# Patient Record
Sex: MALE | Race: WHITE
[De-identification: names, ages, dates, MRNs, and addresses within clinical notes are randomized per-mention and may not be internally consistent; named-entity substitution may affect disease eponyms.]

---

## 2014-03-05 LAB
ALBUMIN SERPL-MCNC: NORMAL G/DL
ALP BLD-CCNC: NORMAL U/L
ALT SERPL-CCNC: NORMAL U/L
ANION GAP SERPL CALCULATED.3IONS-SCNC: NORMAL MMOL/L
AST SERPL-CCNC: NORMAL U/L
AVERAGE GLUCOSE: NORMAL
BILIRUB SERPL-MCNC: NORMAL MG/DL
BUN BLDV-MCNC: NORMAL MG/DL
CALCIUM SERPL-MCNC: NORMAL MG/DL
CHLORIDE BLD-SCNC: NORMAL MMOL/L
CHOLESTEROL, TOTAL: NORMAL
CHOLESTEROL/HDL RATIO: NORMAL
CO2: NORMAL
CREAT SERPL-MCNC: NORMAL MG/DL
CREATININE, URINE: NORMAL
GFR CALCULATED: NORMAL
GLUCOSE BLD-MCNC: NORMAL MG/DL
HBA1C MFR BLD: 9.8 %
HDLC SERPL-MCNC: NORMAL MG/DL
LDL CHOLESTEROL CALCULATED: NORMAL
MICROALBUMIN/CREAT 24H UR: 0.7 MG/G{CREAT}
MICROALBUMIN/CREAT UR-RTO: NORMAL
POTASSIUM SERPL-SCNC: NORMAL MMOL/L
SODIUM BLD-SCNC: NORMAL MMOL/L
TOTAL PROTEIN: NORMAL
TRIGL SERPL-MCNC: NORMAL MG/DL
VLDLC SERPL CALC-MCNC: NORMAL MG/DL

## 2017-04-19 ENCOUNTER — HOSPITAL ENCOUNTER (OUTPATIENT)
Dept: HOSPITAL 83 - US | Age: 82
Discharge: HOME | End: 2017-04-19
Attending: INTERNAL MEDICINE
Payer: MEDICARE

## 2017-04-19 DIAGNOSIS — E11.22: ICD-10-CM

## 2017-04-19 DIAGNOSIS — I12.9: Primary | ICD-10-CM

## 2017-04-19 DIAGNOSIS — N25.81: ICD-10-CM

## 2017-04-19 DIAGNOSIS — D63.1: ICD-10-CM

## 2017-04-19 DIAGNOSIS — Z79.899: ICD-10-CM

## 2017-04-19 DIAGNOSIS — E55.9: ICD-10-CM

## 2017-04-19 DIAGNOSIS — N18.3: ICD-10-CM

## 2017-04-19 LAB
25(OH)D3 SERPL-MCNC: 36.3 NG/ML (ref 30–100)
ALBUMIN SERPL-MCNC: 3.6 GM/DL (ref 3.1–4.5)
ALBUMIN SERPL-MCNC: NEGATIVE G/DL
APPEARANCE UR: (no result)
BASOPHILS # BLD AUTO: 0.1 10*3/UL (ref 0–0.1)
BASOPHILS NFR BLD AUTO: 1 % (ref 0–1)
BILIRUB UR QL STRIP: NEGATIVE
BUN SERPL-MCNC: 29 MG/DL (ref 7–24)
CHLORIDE SERPL-SCNC: 103 MMOL/L (ref 98–107)
CO2 SERPL-SCNC: 27 MMOL/L (ref 21–32)
COLOR UR: (no result)
EOSINOPHIL # BLD AUTO: 0.2 10*3/UL (ref 0–0.4)
EOSINOPHIL # BLD AUTO: 2.9 % (ref 1–4)
EPI CELLS #/AREA URNS HPF: (no result) /[HPF]
ERYTHROCYTE [DISTWIDTH] IN BLOOD BY AUTOMATED COUNT: 14.9 % (ref 0–14.5)
EST. AVERAGE GLUCOSE BLD GHB EST-MCNC: 217 MG/DL
FERRITIN SERPL-MCNC: 32.7 NG/ML (ref 22–322)
GLUCOSE SERPL-MCNC: 172 MG/DL (ref 65–99)
GLUCOSE UR QL: NEGATIVE
HCT VFR BLD AUTO: 39.4 % (ref 42–52)
HGB BLD-MCNC: 12.5 G/DL (ref 14–18)
HGB UR QL STRIP: NEGATIVE
IG #: 0 10*3/UL (ref 0–0.1)
IRON SATN MFR SERPL: 9 %
IRON SERPL-MCNC: 39 UG/DL (ref 65–175)
KETONES UR QL STRIP: NEGATIVE
LEUKOCYTE ESTERASE UR QL STRIP: NEGATIVE
LYMPHOCYTES # BLD AUTO: 1.6 10*3/UL (ref 1.3–4.4)
LYMPHOCYTES NFR BLD AUTO: 20.2 % (ref 27–41)
MAGNESIUM SERPL-MCNC: 2.2 MG/DL (ref 1.5–2.1)
MCH RBC QN AUTO: 27.4 PG (ref 27–31)
MCHC RBC AUTO-ENTMCNC: 31.7 G/DL (ref 33–37)
MCV RBC AUTO: 86.4 FL (ref 80–94)
MONOCYTES # BLD AUTO: 0.6 10*3/UL (ref 0.1–1)
MONOCYTES NFR BLD MANUAL: 7.1 % (ref 3–9)
NEUT #: 5.5 10*3/UL (ref 2.3–7.9)
NEUT %: 68.4 % (ref 47–73)
NITRITE UR QL STRIP: NEGATIVE
NRBC BLD QL AUTO: 0 % (ref 0–0)
PH UR STRIP: 6 [PH] (ref 5–9)
PHOSPHATE SERPL-MCNC: 2.3 MG/DL (ref 2.5–4.9)
PLATELET # BLD AUTO: 389 10*3/UL (ref 130–400)
PMV BLD AUTO: 10 FL (ref 9.6–12.3)
POTASSIUM SERPL-SCNC: 4.5 MMOL/L (ref 3.5–5.1)
PTH-INTACT SERPL-MCNC: 15.9 PG/ML (ref 14–72)
RBC # BLD AUTO: 4.56 10*6/UL (ref 4.5–5.9)
RBC #/AREA URNS HPF: (no result) RBC/HPF (ref 0–2)
SODIUM SERPL-SCNC: 138 MMOL/L (ref 136–145)
SP GR UR: 1.01 (ref 1–1.03)
UIBC SERPL-MCNC: 367 UG/DL (ref 110–410)
URINE REFLEX COMMENT: NO
UROBILINOGEN UR STRIP-MCNC: 0.2 E.U./DL (ref 0.2–1)
WBC NRBC COR # BLD AUTO: 8 10*3/UL (ref 4.8–10.8)

## 2018-01-17 ENCOUNTER — HOSPITAL ENCOUNTER (OUTPATIENT)
Dept: HOSPITAL 83 - D | Age: 83
Discharge: HOME | End: 2018-01-17
Attending: INTERNAL MEDICINE
Payer: MEDICARE

## 2018-01-17 DIAGNOSIS — E11.65: Primary | ICD-10-CM

## 2018-02-26 ENCOUNTER — HOSPITAL ENCOUNTER (OUTPATIENT)
Dept: HOSPITAL 83 - LAB | Age: 83
Discharge: HOME | End: 2018-02-26
Attending: INTERNAL MEDICINE
Payer: MEDICARE

## 2018-02-26 DIAGNOSIS — E11.65: Primary | ICD-10-CM

## 2018-02-26 DIAGNOSIS — E55.9: ICD-10-CM

## 2018-02-26 LAB
APPEARANCE UR: (no result)
BILIRUB UR QL STRIP: NEGATIVE
BUN SERPL-MCNC: 44 MG/DL (ref 7–24)
CHLORIDE SERPL-SCNC: 103 MMOL/L (ref 98–107)
COLOR UR: YELLOW
CREAT SERPL-MCNC: 2.01 MG/DL (ref 0.7–1.3)
GLUCOSE UR QL: NEGATIVE
HGB UR QL STRIP: NEGATIVE
KETONES UR QL STRIP: NEGATIVE
LEUKOCYTE ESTERASE UR QL STRIP: NEGATIVE
NITRITE UR QL STRIP: NEGATIVE
PH UR STRIP: 5.5 [PH] (ref 5–9)
POTASSIUM SERPL-SCNC: 3.9 MMOL/L (ref 3.5–5.1)
SODIUM SERPL-SCNC: 138 MMOL/L (ref 136–145)
SP GR UR: 1.01 (ref 1–1.03)
UROBILINOGEN UR STRIP-MCNC: 0.2 E.U./DL (ref 0.2–1)

## 2018-06-12 ENCOUNTER — HOSPITAL ENCOUNTER (OUTPATIENT)
Dept: HOSPITAL 83 - LAB | Age: 83
Discharge: HOME | End: 2018-06-12
Attending: INTERNAL MEDICINE
Payer: MEDICARE

## 2018-06-12 DIAGNOSIS — E55.9: Primary | ICD-10-CM

## 2018-06-12 DIAGNOSIS — E78.5: ICD-10-CM

## 2018-06-12 DIAGNOSIS — E11.65: ICD-10-CM

## 2018-06-12 LAB
ALBUMIN SERPL-MCNC: 3.6 GM/DL (ref 3.1–4.5)
ALP SERPL-CCNC: 67 U/L (ref 45–117)
ALT SERPL W P-5'-P-CCNC: 28 U/L (ref 12–78)
APPEARANCE UR: (no result)
AST SERPL-CCNC: 15 IU/L (ref 3–35)
BACTERIA #/AREA URNS HPF: (no result) /[HPF]
BILIRUB UR QL STRIP: NEGATIVE
BUN SERPL-MCNC: 30 MG/DL (ref 7–24)
CHLORIDE SERPL-SCNC: 106 MMOL/L (ref 98–107)
CHOLEST SERPL-MCNC: 142 MG/DL (ref ?–200)
COLOR UR: YELLOW
CREAT SERPL-MCNC: 1.67 MG/DL (ref 0.7–1.3)
EPI CELLS #/AREA URNS HPF: (no result) /[HPF]
GLUCOSE UR QL: NEGATIVE
HDLC SERPL-MCNC: 51 MG/DL (ref 40–60)
HGB UR QL STRIP: NEGATIVE
KETONES UR QL STRIP: NEGATIVE
LDLC SERPL DIRECT ASSAY-MCNC: 74 MG/DL (ref 9–159)
LEUKOCYTE ESTERASE UR QL STRIP: NEGATIVE
NITRITE UR QL STRIP: NEGATIVE
PH UR STRIP: 6.5 [PH] (ref 5–9)
POTASSIUM SERPL-SCNC: 3.9 MMOL/L (ref 3.5–5.1)
PROT SERPL-MCNC: 7.8 GM/DL (ref 6.4–8.2)
SODIUM SERPL-SCNC: 140 MMOL/L (ref 136–145)
SP GR UR: 1.02 (ref 1–1.03)
TRIGL SERPL-MCNC: 84 MG/DL (ref ?–150)
UROBILINOGEN UR STRIP-MCNC: 0.2 E.U./DL (ref 0.2–1)
VLDLC SERPL CALC-MCNC: 17 MG/DL (ref 6–40)
WBC #/AREA URNS HPF: (no result) WBC/HPF (ref 0–5)

## 2018-07-09 ENCOUNTER — HOSPITAL ENCOUNTER (OUTPATIENT)
Dept: HOSPITAL 83 - WOUNDCARE | Age: 83
End: 2018-07-09
Attending: NURSE PRACTITIONER
Payer: MEDICARE

## 2018-07-09 DIAGNOSIS — Z87.891: ICD-10-CM

## 2018-07-09 DIAGNOSIS — E66.9: ICD-10-CM

## 2018-07-09 DIAGNOSIS — L97.821: ICD-10-CM

## 2018-07-09 DIAGNOSIS — I10: ICD-10-CM

## 2018-07-09 DIAGNOSIS — E11.622: Primary | ICD-10-CM

## 2018-07-16 ENCOUNTER — HOSPITAL ENCOUNTER (OUTPATIENT)
Dept: HOSPITAL 83 - WOUNDCARE | Age: 83
Discharge: HOME | End: 2018-07-16
Attending: NURSE PRACTITIONER
Payer: MEDICARE

## 2018-07-16 DIAGNOSIS — E66.9: ICD-10-CM

## 2018-07-16 DIAGNOSIS — E11.622: Primary | ICD-10-CM

## 2018-07-16 DIAGNOSIS — N18.9: ICD-10-CM

## 2018-07-16 DIAGNOSIS — I12.9: ICD-10-CM

## 2018-07-16 DIAGNOSIS — L97.821: ICD-10-CM

## 2018-07-16 DIAGNOSIS — E11.22: ICD-10-CM

## 2018-07-16 DIAGNOSIS — Z87.891: ICD-10-CM

## 2018-07-24 ENCOUNTER — HOSPITAL ENCOUNTER (OUTPATIENT)
Dept: HOSPITAL 83 - US | Age: 83
Discharge: HOME | End: 2018-07-24
Attending: NURSE PRACTITIONER
Payer: MEDICARE

## 2018-07-24 DIAGNOSIS — E66.9: ICD-10-CM

## 2018-07-24 DIAGNOSIS — I87.319: ICD-10-CM

## 2018-07-24 DIAGNOSIS — L03.116: ICD-10-CM

## 2018-07-24 DIAGNOSIS — E11.622: Primary | ICD-10-CM

## 2018-07-24 DIAGNOSIS — R09.89: ICD-10-CM

## 2018-07-24 DIAGNOSIS — E11.22: ICD-10-CM

## 2018-07-24 DIAGNOSIS — L97.821: ICD-10-CM

## 2018-07-24 DIAGNOSIS — Z87.891: ICD-10-CM

## 2018-07-24 DIAGNOSIS — N18.9: ICD-10-CM

## 2018-07-24 DIAGNOSIS — I12.9: ICD-10-CM

## 2018-07-24 DIAGNOSIS — I10: ICD-10-CM

## 2018-07-30 ENCOUNTER — HOSPITAL ENCOUNTER (OUTPATIENT)
Dept: HOSPITAL 83 - WOUNDCARE | Age: 83
Discharge: HOME | End: 2018-07-30
Attending: NURSE PRACTITIONER
Payer: MEDICARE

## 2018-07-30 DIAGNOSIS — E11.622: Primary | ICD-10-CM

## 2018-07-30 DIAGNOSIS — I12.9: ICD-10-CM

## 2018-07-30 DIAGNOSIS — Z87.891: ICD-10-CM

## 2018-07-30 DIAGNOSIS — N18.9: ICD-10-CM

## 2018-07-30 DIAGNOSIS — E66.9: ICD-10-CM

## 2018-07-30 DIAGNOSIS — L97.821: ICD-10-CM

## 2018-07-30 DIAGNOSIS — E11.22: ICD-10-CM

## 2018-11-05 ENCOUNTER — HOSPITAL ENCOUNTER (OUTPATIENT)
Dept: HOSPITAL 83 - LAB | Age: 83
Discharge: HOME | End: 2018-11-05
Attending: INTERNAL MEDICINE
Payer: MEDICARE

## 2018-11-05 DIAGNOSIS — E11.65: Primary | ICD-10-CM

## 2018-11-05 DIAGNOSIS — E78.5: ICD-10-CM

## 2018-11-05 DIAGNOSIS — E55.9: ICD-10-CM

## 2018-11-05 LAB
ALBUMIN SERPL-MCNC: 3.4 GM/DL (ref 3.1–4.5)
ALP SERPL-CCNC: 59 U/L (ref 45–117)
ALT SERPL W P-5'-P-CCNC: 24 U/L (ref 12–78)
APPEARANCE UR: (no result)
AST SERPL-CCNC: 19 IU/L (ref 3–35)
BACTERIA #/AREA URNS HPF: (no result) /[HPF]
BILIRUB UR QL STRIP: NEGATIVE
BUN SERPL-MCNC: 32 MG/DL (ref 7–24)
CHLORIDE SERPL-SCNC: 107 MMOL/L (ref 98–107)
CHOLEST SERPL-MCNC: 142 MG/DL (ref ?–200)
COLOR UR: YELLOW
CREAT SERPL-MCNC: 1.57 MG/DL (ref 0.7–1.3)
EPI CELLS #/AREA URNS HPF: (no result) /[HPF]
GLUCOSE UR QL: NEGATIVE
HDLC SERPL-MCNC: 46 MG/DL (ref 40–60)
HGB UR QL STRIP: NEGATIVE
KETONES UR QL STRIP: NEGATIVE
LDLC SERPL DIRECT ASSAY-MCNC: 80 MG/DL (ref 9–159)
LEUKOCYTE ESTERASE UR QL STRIP: NEGATIVE
NITRITE UR QL STRIP: NEGATIVE
PH UR STRIP: 6 [PH] (ref 5–9)
POTASSIUM SERPL-SCNC: 4.2 MMOL/L (ref 3.5–5.1)
PROT SERPL-MCNC: 7.8 GM/DL (ref 6.4–8.2)
RBC #/AREA URNS HPF: (no result) RBC/HPF (ref 0–2)
SODIUM SERPL-SCNC: 140 MMOL/L (ref 136–145)
SP GR UR: 1.02 (ref 1–1.03)
TRIGL SERPL-MCNC: 79 MG/DL (ref ?–150)
UROBILINOGEN UR STRIP-MCNC: 0.2 E.U./DL (ref 0.2–1)
VLDLC SERPL CALC-MCNC: 16 MG/DL (ref 6–40)
WBC #/AREA URNS HPF: (no result) WBC/HPF (ref 0–5)

## 2019-06-05 ENCOUNTER — HOSPITAL ENCOUNTER (OUTPATIENT)
Dept: HOSPITAL 83 - LAB | Age: 84
Discharge: HOME | End: 2019-06-05
Attending: INTERNAL MEDICINE
Payer: MEDICARE

## 2019-06-05 DIAGNOSIS — E11.21: Primary | ICD-10-CM

## 2019-06-05 DIAGNOSIS — N18.3: ICD-10-CM

## 2019-06-05 DIAGNOSIS — D63.1: ICD-10-CM

## 2019-06-05 DIAGNOSIS — Z79.899: ICD-10-CM

## 2019-06-05 DIAGNOSIS — E55.9: ICD-10-CM

## 2019-06-05 DIAGNOSIS — N25.81: ICD-10-CM

## 2019-06-05 DIAGNOSIS — E11.22: ICD-10-CM

## 2019-06-05 LAB
25(OH)D3 SERPL-MCNC: 20 NG/ML (ref 30–100)
ALBUMIN SERPL-MCNC: 3.5 GM/DL (ref 3.1–4.5)
APPEARANCE UR: CLEAR
BACTERIA #/AREA URNS HPF: (no result) /[HPF]
BASOPHILS # BLD AUTO: 0.1 10*3/UL (ref 0–0.1)
BASOPHILS NFR BLD AUTO: 1 % (ref 0–1)
BILIRUB UR QL STRIP: NEGATIVE
BUN SERPL-MCNC: 35 MG/DL (ref 7–24)
CASTS URNS QL MICRO: (no result)
CHLORIDE SERPL-SCNC: 101 MMOL/L (ref 98–107)
COLOR UR: YELLOW
CREAT SERPL-MCNC: 2.43 MG/DL (ref 0.7–1.3)
EOSINOPHIL # BLD AUTO: 0.3 10*3/UL (ref 0–0.4)
EOSINOPHIL # BLD AUTO: 3.3 % (ref 1–4)
EPI CELLS #/AREA URNS HPF: (no result) /[HPF]
ERYTHROCYTE [DISTWIDTH] IN BLOOD BY AUTOMATED COUNT: 14 % (ref 0–14.5)
FERRITIN SERPL-MCNC: 52.3 NG/ML (ref 22–322)
GLUCOSE UR QL: NEGATIVE
HCT VFR BLD AUTO: 41.4 % (ref 42–52)
HGB BLD-MCNC: 13 G/DL (ref 14–18)
HGB UR QL STRIP: NEGATIVE
IRON SERPL-MCNC: 50 UG/DL (ref 65–175)
KETONES UR QL STRIP: NEGATIVE
LEUKOCYTE ESTERASE UR QL STRIP: NEGATIVE
LYMPHOCYTES # BLD AUTO: 1.7 10*3/UL (ref 1.3–4.4)
LYMPHOCYTES NFR BLD AUTO: 19.4 % (ref 27–41)
MCH RBC QN AUTO: 28.3 PG (ref 27–31)
MCHC RBC AUTO-ENTMCNC: 31.4 G/DL (ref 33–37)
MCV RBC AUTO: 90.2 FL (ref 80–94)
MONOCYTES # BLD AUTO: 0.7 10*3/UL (ref 0.1–1)
MONOCYTES NFR BLD MANUAL: 7.9 % (ref 3–9)
NEUT #: 5.9 10*3/UL (ref 2.3–7.9)
NEUT %: 68.2 % (ref 47–73)
NITRITE UR QL STRIP: NEGATIVE
NRBC BLD QL AUTO: 0 10*3/UL (ref 0–0)
PH UR STRIP: 6 [PH] (ref 5–9)
PHOSPHATE SERPL-MCNC: 2.5 MG/DL (ref 2.5–4.9)
PLATELET # BLD AUTO: 380 10*3/UL (ref 130–400)
PMV BLD AUTO: 10 FL (ref 9.6–12.3)
POTASSIUM SERPL-SCNC: 4.8 MMOL/L (ref 3.5–5.1)
PTH-INTACT SERPL-MCNC: 49.4 PG/ML (ref 18.5–88)
RBC # BLD AUTO: 4.59 10*6/UL (ref 4.5–5.9)
SODIUM SERPL-SCNC: 135 MMOL/L (ref 136–145)
SP GR UR: <= 1.005 (ref 1–1.03)
TIBC SERPL-MCNC: 340 UG/DL (ref 250–450)
UROBILINOGEN UR STRIP-MCNC: 0.2 E.U./DL (ref 0.2–1)
WBC #/AREA URNS HPF: (no result) WBC/HPF (ref 0–5)
WBC NRBC COR # BLD AUTO: 8.7 10*3/UL (ref 4.8–10.8)

## 2019-10-25 ENCOUNTER — HOSPITAL ENCOUNTER (EMERGENCY)
Dept: HOSPITAL 83 - ED | Age: 84
Discharge: HOME | End: 2019-10-25
Payer: MEDICARE

## 2019-10-25 VITALS — BODY MASS INDEX: 38.5 KG/M2 | HEIGHT: 73.98 IN | WEIGHT: 300 LBS

## 2019-10-25 DIAGNOSIS — Z86.718: ICD-10-CM

## 2019-10-25 DIAGNOSIS — Z86.73: ICD-10-CM

## 2019-10-25 DIAGNOSIS — E78.5: ICD-10-CM

## 2019-10-25 DIAGNOSIS — Z79.899: ICD-10-CM

## 2019-10-25 DIAGNOSIS — Z90.49: ICD-10-CM

## 2019-10-25 DIAGNOSIS — E86.0: Primary | ICD-10-CM

## 2019-10-25 DIAGNOSIS — Z79.4: ICD-10-CM

## 2019-10-25 DIAGNOSIS — I10: ICD-10-CM

## 2019-10-25 DIAGNOSIS — E11.9: ICD-10-CM

## 2019-10-25 LAB
ALBUMIN SERPL-MCNC: 3.5 GM/DL (ref 3.1–4.5)
ALP SERPL-CCNC: 56 U/L (ref 45–117)
ALT SERPL W P-5'-P-CCNC: 26 U/L (ref 12–78)
APPEARANCE UR: CLEAR
APTT PPP: 28.2 SECONDS (ref 20–32.1)
AST SERPL-CCNC: 13 IU/L (ref 3–35)
BACTERIA #/AREA URNS HPF: (no result) /[HPF]
BASOPHILS # BLD AUTO: 0.1 10*3/UL (ref 0–0.1)
BASOPHILS NFR BLD AUTO: 1.2 % (ref 0–1)
BILIRUB UR QL STRIP: NEGATIVE
BUN SERPL-MCNC: 35 MG/DL (ref 7–24)
CHLORIDE SERPL-SCNC: 107 MMOL/L (ref 98–107)
COLOR UR: YELLOW
CREAT SERPL-MCNC: 2.18 MG/DL (ref 0.7–1.3)
EOSINOPHIL # BLD AUTO: 0.2 10*3/UL (ref 0–0.4)
EOSINOPHIL # BLD AUTO: 2.4 % (ref 1–4)
EPI CELLS #/AREA URNS HPF: (no result) /[HPF]
ERYTHROCYTE [DISTWIDTH] IN BLOOD BY AUTOMATED COUNT: 14 % (ref 0–14.5)
GLUCOSE UR QL: NEGATIVE
HCT VFR BLD AUTO: 40.8 % (ref 42–52)
HGB BLD-MCNC: 12.7 G/DL (ref 14–18)
HGB UR QL STRIP: NEGATIVE
INR BLD: 1 (ref 2–3.5)
KETONES UR QL STRIP: NEGATIVE
LEUKOCYTE ESTERASE UR QL STRIP: NEGATIVE
LIPASE SERPL-CCNC: 121 U/L (ref 73–393)
LYMPHOCYTES # BLD AUTO: 1.4 10*3/UL (ref 1.3–4.4)
LYMPHOCYTES NFR BLD AUTO: 14.9 % (ref 27–41)
MCH RBC QN AUTO: 28.7 PG (ref 27–31)
MCHC RBC AUTO-ENTMCNC: 31.1 G/DL (ref 33–37)
MCV RBC AUTO: 92.3 FL (ref 80–94)
MONOCYTES # BLD AUTO: 0.7 10*3/UL (ref 0.1–1)
MONOCYTES NFR BLD MANUAL: 6.9 % (ref 3–9)
NEUT #: 7 10*3/UL (ref 2.3–7.9)
NEUT %: 74.2 % (ref 47–73)
NITRITE UR QL STRIP: NEGATIVE
NRBC BLD QL AUTO: 0 10*3/UL (ref 0–0)
PH UR STRIP: 7 [PH] (ref 5–9)
PLATELET # BLD AUTO: 397 10*3/UL (ref 130–400)
PMV BLD AUTO: 9.7 FL (ref 9.6–12.3)
POTASSIUM SERPL-SCNC: 4.2 MMOL/L (ref 3.5–5.1)
PROT SERPL-MCNC: 8.1 GM/DL (ref 6.4–8.2)
RBC # BLD AUTO: 4.42 10*6/UL (ref 4.5–5.9)
RBC #/AREA URNS HPF: (no result) RBC/HPF (ref 0–2)
SODIUM SERPL-SCNC: 139 MMOL/L (ref 136–145)
SP GR UR: 1.01 (ref 1–1.03)
TROPONIN I SERPL-MCNC: < 0.015 NG/ML (ref ?–0.04)
UROBILINOGEN UR STRIP-MCNC: 0.2 E.U./DL (ref 0.2–1)
WBC NRBC COR # BLD AUTO: 9.4 10*3/UL (ref 4.8–10.8)

## 2020-01-11 ENCOUNTER — HOSPITAL ENCOUNTER (OUTPATIENT)
Dept: HOSPITAL 83 - LAB | Age: 85
Discharge: HOME | End: 2020-01-11
Attending: INTERNAL MEDICINE
Payer: MEDICARE

## 2020-01-11 DIAGNOSIS — E11.65: Primary | ICD-10-CM

## 2020-01-11 DIAGNOSIS — E78.5: ICD-10-CM

## 2020-01-11 DIAGNOSIS — E55.9: ICD-10-CM

## 2020-01-11 LAB
ALBUMIN SERPL-MCNC: 3.3 GM/DL (ref 3.1–4.5)
ALP SERPL-CCNC: 60 U/L (ref 45–117)
ALT SERPL W P-5'-P-CCNC: 19 U/L (ref 12–78)
APPEARANCE UR: (no result)
AST SERPL-CCNC: 11 IU/L (ref 3–35)
BACTERIA #/AREA URNS HPF: (no result) /[HPF]
BILIRUB UR QL STRIP: NEGATIVE
BUN SERPL-MCNC: 30 MG/DL (ref 7–24)
CHLORIDE SERPL-SCNC: 107 MMOL/L (ref 98–107)
CHOLEST SERPL-MCNC: 149 MG/DL (ref ?–200)
COLOR UR: YELLOW
CREAT SERPL-MCNC: 1.8 MG/DL (ref 0.7–1.3)
EPI CELLS #/AREA URNS HPF: (no result) /[HPF]
GLUCOSE UR QL: NEGATIVE
HDLC SERPL-MCNC: 42 MG/DL (ref 40–60)
HGB UR QL STRIP: NEGATIVE
KETONES UR QL STRIP: NEGATIVE
LDLC SERPL DIRECT ASSAY-MCNC: 85 MG/DL (ref 9–159)
LEUKOCYTE ESTERASE UR QL STRIP: NEGATIVE
MUCOUS THREADS URNS QL MICRO: (no result)
NITRITE UR QL STRIP: NEGATIVE
PH UR STRIP: 6 [PH] (ref 5–9)
POTASSIUM SERPL-SCNC: 4.5 MMOL/L (ref 3.5–5.1)
PROT SERPL-MCNC: 7.6 GM/DL (ref 6.4–8.2)
RBC #/AREA URNS HPF: (no result) RBC/HPF (ref 0–2)
SODIUM SERPL-SCNC: 138 MMOL/L (ref 136–145)
SP GR UR: 1.02 (ref 1–1.03)
TRIGL SERPL-MCNC: 109 MG/DL (ref ?–150)
UROBILINOGEN UR STRIP-MCNC: 0.2 E.U./DL (ref 0.2–1)
VLDLC SERPL CALC-MCNC: 22 MG/DL (ref 6–40)
WBC #/AREA URNS HPF: (no result) WBC/HPF (ref 0–5)

## 2020-01-16 RX ORDER — CLONIDINE HYDROCHLORIDE 0.1 MG/1
0.1 TABLET ORAL 3 TIMES DAILY
COMMUNITY

## 2020-01-16 RX ORDER — PRAVASTATIN SODIUM 40 MG
40 TABLET ORAL DAILY
COMMUNITY
End: 2022-05-20

## 2020-01-16 RX ORDER — FENOFIBRATE 145 MG/1
145 TABLET, COATED ORAL DAILY
COMMUNITY

## 2020-01-16 RX ORDER — GLIPIZIDE 10 MG/1
10 TABLET, FILM COATED, EXTENDED RELEASE ORAL DAILY
COMMUNITY
End: 2022-05-20

## 2020-01-16 SDOH — HEALTH STABILITY: MENTAL HEALTH: HOW OFTEN DO YOU HAVE A DRINK CONTAINING ALCOHOL?: NEVER

## 2020-09-10 ENCOUNTER — HOSPITAL ENCOUNTER (OUTPATIENT)
Dept: HOSPITAL 83 - LAB | Age: 85
Discharge: HOME | End: 2020-09-10
Attending: INTERNAL MEDICINE
Payer: MEDICARE

## 2020-09-10 DIAGNOSIS — E78.5: ICD-10-CM

## 2020-09-10 DIAGNOSIS — R53.83: ICD-10-CM

## 2020-09-10 DIAGNOSIS — E11.65: Primary | ICD-10-CM

## 2020-09-10 DIAGNOSIS — E55.9: ICD-10-CM

## 2020-09-10 DIAGNOSIS — N40.0: ICD-10-CM

## 2020-09-10 LAB
25(OH)D3 SERPL-MCNC: 39.5 NG/ML (ref 30–100)
ALBUMIN SERPL-MCNC: 3.5 GM/DL (ref 3.1–4.5)
ALP SERPL-CCNC: 59 U/L (ref 45–117)
ALT SERPL W P-5'-P-CCNC: 25 U/L (ref 12–78)
APPEARANCE UR: (no result)
AST SERPL-CCNC: 15 IU/L (ref 3–35)
BACTERIA #/AREA URNS HPF: (no result) /[HPF]
BILIRUB UR QL STRIP: NEGATIVE
BUN SERPL-MCNC: 22 MG/DL (ref 7–24)
CHLORIDE SERPL-SCNC: 108 MMOL/L (ref 98–107)
CHOLEST SERPL-MCNC: 170 MG/DL (ref ?–200)
COLOR UR: YELLOW
CREAT SERPL-MCNC: 1.59 MG/DL (ref 0.7–1.3)
EPI CELLS #/AREA URNS HPF: (no result) /[HPF]
GLUCOSE UR QL: NEGATIVE
HDLC SERPL-MCNC: 45 MG/DL (ref 40–60)
HGB UR QL STRIP: NEGATIVE
IRON SERPL-MCNC: 44 UG/DL (ref 65–175)
KETONES UR QL STRIP: NEGATIVE
LDLC SERPL DIRECT ASSAY-MCNC: 106 MG/DL (ref 9–159)
LEUKOCYTE ESTERASE UR QL STRIP: (no result)
MUCOUS THREADS URNS QL MICRO: (no result)
NITRITE UR QL STRIP: NEGATIVE
PH UR STRIP: 6.5 [PH] (ref 4.5–8)
POTASSIUM SERPL-SCNC: 4.2 MMOL/L (ref 3.5–5.1)
PROT SERPL-MCNC: 7.6 GM/DL (ref 6.4–8.2)
RBC #/AREA URNS HPF: (no result) RBC/HPF (ref 0–2)
SODIUM SERPL-SCNC: 142 MMOL/L (ref 136–145)
SP GR UR: 1.01 (ref 1–1.03)
T4 FREE SERPL-MCNC: 1.12 NG/DL (ref 0.76–1.46)
TESTOST SERPL-MCNC: 182 NG/DL (ref 241–827)
TIBC SERPL-MCNC: 347 UG/DL (ref 250–450)
TRIGL SERPL-MCNC: 96 MG/DL (ref ?–150)
TSH SERPL DL<=0.005 MIU/L-ACNC: 4.35 UIU/ML (ref 0.36–4.75)
UROBILINOGEN UR STRIP-MCNC: 1 E.U./DL (ref 0–1)
VITAMIN B12: 340 PG/ML (ref 247–911)
VLDLC SERPL CALC-MCNC: 19 MG/DL (ref 6–40)
WBC #/AREA URNS HPF: (no result) WBC/HPF (ref 0–5)

## 2020-12-02 ENCOUNTER — HOSPITAL ENCOUNTER (OUTPATIENT)
Dept: HOSPITAL 83 - LAB | Age: 85
Discharge: HOME | End: 2020-12-02
Attending: INTERNAL MEDICINE
Payer: MEDICARE

## 2020-12-02 DIAGNOSIS — E11.65: Primary | ICD-10-CM

## 2020-12-02 DIAGNOSIS — E55.9: ICD-10-CM

## 2020-12-02 DIAGNOSIS — N40.0: ICD-10-CM

## 2020-12-02 DIAGNOSIS — E78.5: ICD-10-CM

## 2020-12-02 DIAGNOSIS — R53.83: ICD-10-CM

## 2020-12-02 LAB
25(OH)D3 SERPL-MCNC: 67.3 NG/ML (ref 30–100)
ALBUMIN SERPL-MCNC: 3.3 GM/DL (ref 3.1–4.5)
ALP SERPL-CCNC: 74 U/L (ref 45–117)
ALT SERPL W P-5'-P-CCNC: 19 U/L (ref 12–78)
AST SERPL-CCNC: 16 IU/L (ref 3–35)
BACTERIA #/AREA URNS HPF: (no result) /[HPF]
BUN SERPL-MCNC: 25 MG/DL (ref 7–24)
CHLORIDE SERPL-SCNC: 105 MMOL/L (ref 98–107)
CHOLEST SERPL-MCNC: 156 MG/DL (ref ?–200)
CREAT SERPL-MCNC: 1.55 MG/DL (ref 0.7–1.3)
EPI CELLS #/AREA URNS HPF: (no result) /[HPF]
HDLC SERPL-MCNC: 52 MG/DL (ref 40–60)
IRON SERPL-MCNC: 57 UG/DL (ref 65–175)
LDLC SERPL DIRECT ASSAY-MCNC: 83 MG/DL (ref 9–159)
LEUKOCYTE ESTERASE UR QL STRIP: (no result)
MUCOUS THREADS URNS QL MICRO: (no result)
PH UR STRIP: 7 [PH] (ref 4.5–8)
POTASSIUM SERPL-SCNC: 4.7 MMOL/L (ref 3.5–5.1)
PROT SERPL-MCNC: 8.1 GM/DL (ref 6.4–8.2)
RBC #/AREA URNS HPF: (no result) RBC/HPF (ref 0–2)
SODIUM SERPL-SCNC: 138 MMOL/L (ref 136–145)
SP GR UR: 1.01 (ref 1–1.03)
T4 FREE SERPL-MCNC: 1.16 NG/DL (ref 0.76–1.46)
TESTOST SERPL-MCNC: 194 NG/DL (ref 241–827)
TIBC SERPL-MCNC: 349 UG/DL (ref 250–450)
TRIGL SERPL-MCNC: 103 MG/DL (ref ?–150)
TSH SERPL DL<=0.005 MIU/L-ACNC: 5.9 UIU/ML (ref 0.36–4.75)
UROBILINOGEN UR STRIP-MCNC: 1 E.U./DL (ref 0–1)
VITAMIN B12: 381 PG/ML (ref 247–911)
VLDLC SERPL CALC-MCNC: 21 MG/DL (ref 6–40)
WBC #/AREA URNS HPF: (no result) WBC/HPF (ref 0–5)

## 2021-02-22 ENCOUNTER — HOSPITAL ENCOUNTER (OUTPATIENT)
Dept: HOSPITAL 83 - LAB | Age: 86
Discharge: HOME | End: 2021-02-22
Attending: INTERNAL MEDICINE
Payer: MEDICARE

## 2021-02-22 DIAGNOSIS — D63.1: ICD-10-CM

## 2021-02-22 DIAGNOSIS — E55.9: ICD-10-CM

## 2021-02-22 DIAGNOSIS — E11.22: Primary | ICD-10-CM

## 2021-02-22 DIAGNOSIS — Z79.899: ICD-10-CM

## 2021-02-22 DIAGNOSIS — N18.31: ICD-10-CM

## 2021-02-22 DIAGNOSIS — N25.81: ICD-10-CM

## 2021-02-22 DIAGNOSIS — E11.21: ICD-10-CM

## 2021-02-22 LAB
25(OH)D3 SERPL-MCNC: 72.7 NG/ML (ref 30–100)
BACTERIA #/AREA URNS HPF: (no result) /[HPF]
BASOPHILS # BLD AUTO: 0.1 10*3/UL (ref 0–0.1)
BASOPHILS NFR BLD AUTO: 0.9 % (ref 0–1)
EOSINOPHIL # BLD AUTO: 0.4 10*3/UL (ref 0–0.4)
EOSINOPHIL # BLD AUTO: 2.7 % (ref 1–4)
EPI CELLS #/AREA URNS HPF: (no result) /[HPF]
ERYTHROCYTE [DISTWIDTH] IN BLOOD BY AUTOMATED COUNT: 14 % (ref 0–14.5)
FERRITIN SERPL-MCNC: 47.4 NG/ML (ref 22–322)
HCT VFR BLD AUTO: 44.9 % (ref 42–52)
IRON SERPL-MCNC: 51 UG/DL (ref 65–175)
LEUKOCYTE ESTERASE UR QL STRIP: (no result)
LYMPHOCYTES # BLD AUTO: 4 10*3/UL (ref 1.3–4.4)
LYMPHOCYTES NFR BLD AUTO: 30.8 % (ref 27–41)
MCH RBC QN AUTO: 27.8 PG (ref 27–31)
MCHC RBC AUTO-ENTMCNC: 30.7 G/DL (ref 33–37)
MCV RBC AUTO: 90.5 FL (ref 80–94)
MONOCYTES # BLD AUTO: 1.1 10*3/UL (ref 0.1–1)
MONOCYTES NFR BLD MANUAL: 8.4 % (ref 3–9)
NEUT #: 7.4 10*3/UL (ref 2.3–7.9)
NEUT %: 56.9 % (ref 47–73)
NRBC BLD QL AUTO: 0 % (ref 0–0)
PH UR STRIP: 6 [PH] (ref 4.5–8)
PLATELET # BLD AUTO: 458 10*3/UL (ref 130–400)
PMV BLD AUTO: 9.5 FL (ref 9.6–12.3)
PTH-INTACT SERPL-MCNC: 18.6 PG/ML (ref 18.5–88)
RBC # BLD AUTO: 4.96 10*6/UL (ref 4.5–5.9)
RBC #/AREA URNS HPF: (no result) RBC/HPF (ref 0–2)
SP GR UR: 1.01 (ref 1–1.03)
TIBC SERPL-MCNC: 374 UG/DL (ref 250–450)
UROBILINOGEN UR STRIP-MCNC: 0.2 E.U./DL (ref 0–1)
WBC #/AREA URNS HPF: (no result) WBC/HPF (ref 0–5)
WBC NRBC COR # BLD AUTO: 13.1 10*3/UL (ref 4.8–10.8)

## 2021-04-07 ENCOUNTER — HOSPITAL ENCOUNTER (OUTPATIENT)
Dept: HOSPITAL 83 - LAB | Age: 86
Discharge: HOME | End: 2021-04-07
Attending: INTERNAL MEDICINE
Payer: MEDICARE

## 2021-04-07 DIAGNOSIS — E11.65: Primary | ICD-10-CM

## 2021-04-07 DIAGNOSIS — E78.5: ICD-10-CM

## 2021-04-07 DIAGNOSIS — E55.9: ICD-10-CM

## 2021-04-07 DIAGNOSIS — D50.8: ICD-10-CM

## 2021-04-07 LAB
ALBUMIN SERPL-MCNC: 3.4 GM/DL (ref 3.1–4.5)
ALP SERPL-CCNC: 71 U/L (ref 45–117)
ALT SERPL W P-5'-P-CCNC: 24 U/L (ref 12–78)
AST SERPL-CCNC: 10 IU/L (ref 3–35)
BUN SERPL-MCNC: 27 MG/DL (ref 7–24)
CHLORIDE SERPL-SCNC: 105 MMOL/L (ref 98–107)
CHOLEST SERPL-MCNC: 175 MG/DL (ref ?–200)
CREAT SERPL-MCNC: 1.59 MG/DL (ref 0.7–1.3)
EPI CELLS #/AREA URNS HPF: (no result) /[HPF]
HDLC SERPL-MCNC: 55 MG/DL (ref 40–60)
IRON SERPL-MCNC: 52 UG/DL (ref 65–175)
LDLC SERPL DIRECT ASSAY-MCNC: 102 MG/DL (ref 9–159)
LEUKOCYTE ESTERASE UR QL STRIP: (no result)
PH UR STRIP: 7.5 [PH] (ref 4.5–8)
POTASSIUM SERPL-SCNC: 4.4 MMOL/L (ref 3.5–5.1)
PROT SERPL-MCNC: 8.1 GM/DL (ref 6.4–8.2)
RBC #/AREA URNS HPF: (no result) RBC/HPF (ref 0–2)
SODIUM SERPL-SCNC: 136 MMOL/L (ref 136–145)
SP GR UR: 1.01 (ref 1–1.03)
TIBC SERPL-MCNC: 403 UG/DL (ref 250–450)
TRIGL SERPL-MCNC: 90 MG/DL (ref ?–150)
UROBILINOGEN UR STRIP-MCNC: 1 E.U./DL (ref 0–1)
VLDLC SERPL CALC-MCNC: 18 MG/DL (ref 6–40)
WBC #/AREA URNS HPF: (no result) WBC/HPF (ref 0–5)

## 2021-09-08 ENCOUNTER — HOSPITAL ENCOUNTER (OUTPATIENT)
Dept: HOSPITAL 83 - LAB | Age: 86
Discharge: HOME | End: 2021-09-08
Attending: INTERNAL MEDICINE
Payer: MEDICARE

## 2021-09-08 DIAGNOSIS — E78.5: Primary | ICD-10-CM

## 2021-09-08 DIAGNOSIS — D50.8: ICD-10-CM

## 2021-09-08 DIAGNOSIS — E55.9: ICD-10-CM

## 2021-09-08 DIAGNOSIS — E11.65: ICD-10-CM

## 2021-09-08 LAB
ALBUMIN SERPL-MCNC: 3.3 GM/DL (ref 3.1–4.5)
ALP SERPL-CCNC: 58 U/L (ref 45–117)
ALT SERPL W P-5'-P-CCNC: 22 U/L (ref 12–78)
AST SERPL-CCNC: 14 IU/L (ref 3–35)
BACTERIA #/AREA URNS HPF: (no result) /[HPF]
BUN SERPL-MCNC: 30 MG/DL (ref 7–24)
CHLORIDE SERPL-SCNC: 107 MMOL/L (ref 98–107)
CHOLEST SERPL-MCNC: 162 MG/DL (ref ?–200)
CREAT SERPL-MCNC: 1.8 MG/DL (ref 0.7–1.3)
EPI CELLS #/AREA URNS HPF: (no result) /[HPF]
IRON SERPL-MCNC: 54 UG/DL (ref 65–175)
LDLC SERPL DIRECT ASSAY-MCNC: 89 MG/DL (ref 9–159)
PH UR STRIP: 6.5 [PH] (ref 4.5–8)
POTASSIUM SERPL-SCNC: 3.9 MMOL/L (ref 3.5–5.1)
PROT SERPL-MCNC: 7.9 GM/DL (ref 6.4–8.2)
RBC #/AREA URNS HPF: (no result) RBC/HPF (ref 0–2)
SODIUM SERPL-SCNC: 140 MMOL/L (ref 136–145)
SP GR UR: 1.01 (ref 1–1.03)
TIBC SERPL-MCNC: 408 UG/DL (ref 250–450)
TRIGL SERPL-MCNC: 93 MG/DL (ref ?–150)
UROBILINOGEN UR STRIP-MCNC: 1 E.U./DL (ref 0–1)
WBC #/AREA URNS HPF: (no result) WBC/HPF (ref 0–5)

## 2021-10-14 ENCOUNTER — HOSPITAL ENCOUNTER (OUTPATIENT)
Dept: HOSPITAL 83 - RAD | Age: 86
Discharge: HOME | End: 2021-10-14
Attending: NURSE PRACTITIONER
Payer: MEDICARE

## 2021-10-14 DIAGNOSIS — U07.1: Primary | ICD-10-CM

## 2021-10-14 DIAGNOSIS — R06.02: ICD-10-CM

## 2021-10-14 DIAGNOSIS — J98.11: ICD-10-CM

## 2021-10-14 DIAGNOSIS — J84.89: ICD-10-CM

## 2021-11-19 ENCOUNTER — HOSPITAL ENCOUNTER (OUTPATIENT)
Dept: HOSPITAL 83 - RAD | Age: 86
Discharge: HOME | End: 2021-11-19
Attending: NURSE PRACTITIONER
Payer: MEDICARE

## 2021-11-19 DIAGNOSIS — J12.82: ICD-10-CM

## 2021-11-19 DIAGNOSIS — U07.1: Primary | ICD-10-CM

## 2021-12-17 ENCOUNTER — HOSPITAL ENCOUNTER (OUTPATIENT)
Dept: HOSPITAL 83 - CT | Age: 86
Discharge: HOME | End: 2021-12-17
Attending: NURSE PRACTITIONER
Payer: MEDICARE

## 2021-12-17 DIAGNOSIS — J43.2: Primary | ICD-10-CM

## 2021-12-17 DIAGNOSIS — K80.20: ICD-10-CM

## 2022-02-02 ENCOUNTER — HOSPITAL ENCOUNTER (OUTPATIENT)
Dept: HOSPITAL 83 - LAB | Age: 87
Discharge: HOME | End: 2022-02-02
Attending: INTERNAL MEDICINE
Payer: MEDICARE

## 2022-02-02 DIAGNOSIS — E11.65: Primary | ICD-10-CM

## 2022-02-02 DIAGNOSIS — E55.9: ICD-10-CM

## 2022-02-02 DIAGNOSIS — N40.0: ICD-10-CM

## 2022-02-02 DIAGNOSIS — E29.1: ICD-10-CM

## 2022-02-02 LAB
25(OH)D3 SERPL-MCNC: 36.4 NG/ML (ref 30–100)
ALBUMIN SERPL-MCNC: 3.2 GM/DL (ref 3.1–4.5)
ALP SERPL-CCNC: 64 U/L (ref 45–117)
ALT SERPL W P-5'-P-CCNC: 22 U/L (ref 12–78)
AST SERPL-CCNC: 11 IU/L (ref 3–35)
BUN SERPL-MCNC: 42 MG/DL (ref 7–24)
CHLORIDE SERPL-SCNC: 100 MMOL/L (ref 98–107)
CHOLEST SERPL-MCNC: 165 MG/DL (ref ?–200)
CREAT SERPL-MCNC: 2.11 MG/DL (ref 0.7–1.3)
EPI CELLS #/AREA URNS HPF: (no result) /[HPF]
GLUCOSE UR QL: (no result)
IRON SERPL-MCNC: 55 UG/DL (ref 65–175)
LDLC SERPL DIRECT ASSAY-MCNC: 89 MG/DL (ref 9–159)
MUCOUS THREADS URNS QL MICRO: (no result)
PH UR STRIP: 6 [PH] (ref 4.5–8)
POTASSIUM SERPL-SCNC: 4.3 MMOL/L (ref 3.5–5.1)
PROT SERPL-MCNC: 7.9 GM/DL (ref 6.4–8.2)
SODIUM SERPL-SCNC: 134 MMOL/L (ref 136–145)
SP GR UR: 1.02 (ref 1–1.03)
TESTOST SERPL-MCNC: 271 NG/DL (ref 241–827)
TIBC SERPL-MCNC: 384 UG/DL (ref 250–450)
TRIGL SERPL-MCNC: 132 MG/DL (ref ?–150)
UROBILINOGEN UR STRIP-MCNC: 0.2 E.U./DL (ref 0–1)
WBC #/AREA URNS HPF: (no result) WBC/HPF (ref 0–5)

## 2022-05-20 RX ORDER — HYDRALAZINE HYDROCHLORIDE 10 MG/1
10 TABLET, FILM COATED ORAL 3 TIMES DAILY
COMMUNITY

## 2022-05-20 RX ORDER — INSULIN GLARGINE 100 [IU]/ML
64 INJECTION, SOLUTION SUBCUTANEOUS NIGHTLY
COMMUNITY

## 2022-05-20 RX ORDER — LOSARTAN POTASSIUM 100 MG/1
100 TABLET ORAL DAILY
COMMUNITY

## 2022-05-20 RX ORDER — FUROSEMIDE 40 MG/1
40 TABLET ORAL DAILY
COMMUNITY

## 2022-05-20 RX ORDER — NIFEDIPINE 90 MG/1
90 TABLET, FILM COATED, EXTENDED RELEASE ORAL DAILY
COMMUNITY

## 2022-05-20 NOTE — PROGRESS NOTES
[facility] PRE-ADMISSION TESTING INSTRUCTIONS    The Preadmission Testing patient is instructed accordingly using the following criteria (check applicable):    ARRIVAL INSTRUCTIONS:  [x] Parking the day of Surgery is located in the Main Entrance lot. Upon entering the door, make an immediate right to the surgery reception desk    [x] Bring photo ID and insurance card    [x] Bring in a copy of Living will or Durable Power of  papers. [] Please be sure to arrange for responsible adult to provide transportation to and from the hospital    [x] Please arrange for responsible adult to be with you for the 24 hour period post procedure due to having anesthesia    [x] If you awake am of surgery not feeling well or have temperature >100 please call [phone]    GENERAL INSTRUCTIONS:    [x] Nothing by mouth after midnight, including gum, candy, mints or water    [x] You may brush your teeth, but do not swallow any water    [x] Take medications as instructed with 1-2 oz of water    [x] Stop herbal supplements and vitamins 5 days prior to procedure    [x] Follow preop dosing of blood thinners per physician instructions    [x] Take 1/2 dose of evening insulin, but no insulin after midnight    [x] No oral diabetic medications after midnight    [x] If diabetic and have low blood sugar or feel symptomatic, take 1-2oz apple juice only    [] Bring inhalers day of surgery    [] Bring C-PAP/ Bi-Pap day of surgery    [] Bring urine specimen day of surgery    [x] Shower or bath with soap, lather and rinse well, AM of Surgery, no lotion, powders or creams to surgical site    [] Follow bowel prep as instructed per surgeon    [x] No tobacco products within 24 hours of surgery     [x] No alcohol or illegal drug use within 24 hours of surgery.     [x] Jewelry, body piercing's, eyeglasses, contact lenses and dentures are not permitted into surgery (bring cases)      [] Please do not wear any nail polish, make up or hair products on the day of surgery    [x] You can expect a call the business day prior to procedure to notify you if your arrival time changes    [x] If you receive a survey after surgery we would greatly appreciate your comments    [] Parent/guardian of a minor must accompany their child and remain on the premises  the entire time they are under our care     [] Pediatric patients may bring favorite toy, blanket or comfort item with them    [x] A caregiver or family member must remain with the patient during their stay if they are mentally handicapped, have dementia, disoriented or unable to use a call light or would be a safety concern if left unattended    [x] Please notify surgeon if you develop any illness between now and time of surgery (cold, cough, sore throat, fever, nausea, vomiting) or any signs of infections  including skin, wounds, and dental.    []  The Outpatient Pharmacy is available to fill your prescription here on your day of surgery, ask your preop nurse for details    [x] Other instructions: wear comfortable clothing    EDUCATIONAL MATERIALS PROVIDED:    [] PAT Preoperative Education Packet/Booklet     [] Medication List    [] Transfusion bracelet applied with instructions    [] Shower with soap, lather and rinse well, and use CHG wipes provided the evening before surgery as instructed    [] Incentive spirometer with instructions

## 2022-05-23 ENCOUNTER — PREP FOR PROCEDURE (OUTPATIENT)
Dept: SURGERY | Age: 87
End: 2022-05-23

## 2022-05-23 RX ORDER — SODIUM CHLORIDE 0.9 % (FLUSH) 0.9 %
5-40 SYRINGE (ML) INJECTION PRN
Status: CANCELLED | OUTPATIENT
Start: 2022-05-23

## 2022-05-23 RX ORDER — SODIUM CHLORIDE, SODIUM LACTATE, POTASSIUM CHLORIDE, CALCIUM CHLORIDE 600; 310; 30; 20 MG/100ML; MG/100ML; MG/100ML; MG/100ML
INJECTION, SOLUTION INTRAVENOUS CONTINUOUS
Status: CANCELLED | OUTPATIENT
Start: 2022-05-23

## 2022-05-23 RX ORDER — SODIUM CHLORIDE 0.9 % (FLUSH) 0.9 %
5-40 SYRINGE (ML) INJECTION EVERY 12 HOURS SCHEDULED
Status: CANCELLED | OUTPATIENT
Start: 2022-05-23

## 2022-05-23 NOTE — H&P (VIEW-ONLY)
rhythm, no murmurs[ ]  LUNGS: Clear to auscultation bilaterally, no wheezes[ ]  ABDOMEN: soft, non-tender, non-distended, obese  EXTREMETIES: warm and dry. No rash, cyanosis, or jaundice. Changes consistent with venous stasis disease. Mild bilateral edema to the knees. 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 Requires wide local excision. Due to the location he will most likely require skin grafting; would plan either pinch graft or STSG, probably from the abdominal wall or thigh           Time spent reviewing records, discussing findings, answering questions, reviewing laboratory studies, radiologic exams, and other diagnostics, and reviewing the diagnostic and therapeutic plan: 30 minutes    Voice recognition software was used in the preparation of this document. Despite all efforts to prevent them, transcription errors may have occurred.   Seen by:

## 2022-05-23 NOTE — H&P
Date:   [facility]  Name: Rani Osborn                : 1935 Sex: M  Age: 82 yrs  Acct#:  [id number]          Patient was referred by . Patient's primary care provider is . CHIEF COMPLAINT: Nonhealing skin lesion left leg    HPI:  61-year-old male who has a lesion on his left leg for several months. He takes xarelto for Afib. Meds Prior to Visit:  Losartan Potassium     Clonidine HCL     Xarelto     Furosemide     Nifedipine ER     Fenofibrate     Humalog     Lantus Solostar        Allergies:      PMH:  (Health Maintenance)  (Medical Problems)  (Surgeries)  Reviewed and updated. SURGERIES:[ ]  FH:  Father:  . (Hx)  Mother:  . (Hx)  Reviewed and updated. [ ]  SH:  Personal Habits:  Tobacco Use: Patient has never smoked. Alcohol: Denies alcohol use. Drug Use: Denies Drug Use. Daily Caffeine: Uses Caffeine. Reviewed and updated. [ ]    Date: 2022  Was the patient queried about smoking behavior? Yes  No  Does the patient currently smoke? Tobacco Use: Patient has never smoked. [ ]  ROS:  Const: Denies anorexia, anxiety, fatigue, night sweats, weight gain and weight loss. Eyes: Denies eye symptoms. ENMT: Denies ear symptoms. Denies nasal symptoms. Denies mouth or throat symptoms. CV: Denies hypertension and other cardiovascular symptoms. Resp: Denies respiratory symptoms. GI: Denies hepatitis, liver disease and other gastrointestinal symptoms. Musculo: Denies musculoskeletal symptoms. Skin: Denies skin, hair and nail symptoms. Breast: Denies breast problems. Neuro: Denies neurologic symptoms. Psych: Denies depression and substance abuse. Endocrine: Denies diabetes, kidney disease and thyroid disease. Hema/Lymph: Denies anemia, blood disease, cancer and past transfusion. Allergy/Immuno: Denies immunosuppression. Reviewed and updated. [ ]    Ht: 74\" 6'2\" Wt: 312lb BMI: 40.1      CONSTITUTION:  Non-toxic, no acute distress[ ]  HEART: Regular rate and rhythm, no murmurs[ ]  LUNGS: Clear to auscultation bilaterally, no wheezes[ ]  ABDOMEN: soft, non-tender, non-distended, obese  EXTREMETIES: warm and dry. No rash, cyanosis, or jaundice. Changes consistent with venous stasis disease. Mild bilateral edema to the knees. On the left leg there is a 2 x 2 centimeter raised pigmented lesion     IMAGING: [ ]    LABS: [ ]        Assessment #1: Hx F98.6 Neoplasm of uncertain behavior of skin   Care Plan:              Comments       :  Lesion is suspicious for neoplasm. Requires wide local excision. Due to the location he will most likely require skin grafting; would plan either pinch graft or STSG, probably from the abdominal wall or thigh           Time spent reviewing records, discussing findings, answering questions, reviewing laboratory studies, radiologic exams, and other diagnostics, and reviewing the diagnostic and therapeutic plan: 30 minutes    Voice recognition software was used in the preparation of this document. Despite all efforts to prevent them, transcription errors may have occurred.   Seen by:

## 2022-05-24 ENCOUNTER — ANESTHESIA (OUTPATIENT)
Dept: OPERATING ROOM | Age: 87
End: 2022-05-24
Payer: MEDICARE

## 2022-05-24 ENCOUNTER — APPOINTMENT (OUTPATIENT)
Dept: GENERAL RADIOLOGY | Age: 87
End: 2022-05-24
Attending: SURGERY
Payer: MEDICARE

## 2022-05-24 ENCOUNTER — ANESTHESIA EVENT (OUTPATIENT)
Dept: OPERATING ROOM | Age: 87
End: 2022-05-24
Payer: MEDICARE

## 2022-05-24 ENCOUNTER — HOSPITAL ENCOUNTER (OUTPATIENT)
Age: 87
Setting detail: OUTPATIENT SURGERY
Discharge: HOME | End: 2022-05-24
Attending: SURGERY | Admitting: SURGERY
Payer: MEDICARE

## 2022-05-24 VITALS
DIASTOLIC BLOOD PRESSURE: 84 MMHG | HEIGHT: 73 IN | SYSTOLIC BLOOD PRESSURE: 184 MMHG | BODY MASS INDEX: 41.75 KG/M2 | RESPIRATION RATE: 18 BRPM | OXYGEN SATURATION: 94 % | TEMPERATURE: 97 F | HEART RATE: 97 BPM | WEIGHT: 315 LBS

## 2022-05-24 DIAGNOSIS — D49.9 NEOPLASM: ICD-10-CM

## 2022-05-24 LAB
ANION GAP SERPL CALCULATED.3IONS-SCNC: 10 MMOL/L (ref 7–16)
BUN BLDV-MCNC: 26 MG/DL (ref 6–23)
CALCIUM SERPL-MCNC: 9.4 MG/DL (ref 8.6–10.2)
CHLORIDE BLD-SCNC: 99 MMOL/L (ref 98–107)
CO2: 26 MMOL/L (ref 22–29)
CREAT SERPL-MCNC: 1.5 MG/DL (ref 0.7–1.2)
EKG ATRIAL RATE: 86 BPM
EKG P AXIS: 25 DEGREES
EKG P-R INTERVAL: 176 MS
EKG Q-T INTERVAL: 420 MS
EKG QRS DURATION: 146 MS
EKG QTC CALCULATION (BAZETT): 502 MS
EKG R AXIS: 10 DEGREES
EKG T AXIS: 14 DEGREES
EKG VENTRICULAR RATE: 86 BPM
GFR AFRICAN AMERICAN: 54
GFR NON-AFRICAN AMERICAN: 45 ML/MIN/1.73
GLUCOSE BLD-MCNC: 213 MG/DL (ref 74–99)
HCT VFR BLD CALC: 42 % (ref 37–54)
HEMOGLOBIN: 12.9 G/DL (ref 12.5–16.5)
MCH RBC QN AUTO: 26.5 PG (ref 26–35)
MCHC RBC AUTO-ENTMCNC: 30.7 % (ref 32–34.5)
MCV RBC AUTO: 86.2 FL (ref 80–99.9)
METER GLUCOSE: 193 MG/DL (ref 74–99)
PDW BLD-RTO: 14.9 FL (ref 11.5–15)
PLATELET # BLD: 384 E9/L (ref 130–450)
PMV BLD AUTO: 9.8 FL (ref 7–12)
POTASSIUM SERPL-SCNC: 4.2 MMOL/L (ref 3.5–5)
RBC # BLD: 4.87 E12/L (ref 3.8–5.8)
SODIUM BLD-SCNC: 135 MMOL/L (ref 132–146)
WBC # BLD: 8.1 E9/L (ref 4.5–11.5)

## 2022-05-24 PROCEDURE — 36415 COLL VENOUS BLD VENIPUNCTURE: CPT

## 2022-05-24 PROCEDURE — 7100000010 HC PHASE II RECOVERY - FIRST 15 MIN: Performed by: SURGERY

## 2022-05-24 PROCEDURE — 85027 COMPLETE CBC AUTOMATED: CPT

## 2022-05-24 PROCEDURE — 80048 BASIC METABOLIC PNL TOTAL CA: CPT

## 2022-05-24 PROCEDURE — 2580000003 HC RX 258: Performed by: NURSE ANESTHETIST, CERTIFIED REGISTERED

## 2022-05-24 PROCEDURE — 6360000002 HC RX W HCPCS: Performed by: NURSE ANESTHETIST, CERTIFIED REGISTERED

## 2022-05-24 PROCEDURE — 3600000002 HC SURGERY LEVEL 2 BASE: Performed by: SURGERY

## 2022-05-24 PROCEDURE — 2709999900 HC NON-CHARGEABLE SUPPLY: Performed by: SURGERY

## 2022-05-24 PROCEDURE — 2500000003 HC RX 250 WO HCPCS: Performed by: NURSE ANESTHETIST, CERTIFIED REGISTERED

## 2022-05-24 PROCEDURE — 7100000001 HC PACU RECOVERY - ADDTL 15 MIN: Performed by: SURGERY

## 2022-05-24 PROCEDURE — 82962 GLUCOSE BLOOD TEST: CPT

## 2022-05-24 PROCEDURE — 88304 TISSUE EXAM BY PATHOLOGIST: CPT

## 2022-05-24 PROCEDURE — 71045 X-RAY EXAM CHEST 1 VIEW: CPT

## 2022-05-24 PROCEDURE — 3600000012 HC SURGERY LEVEL 2 ADDTL 15MIN: Performed by: SURGERY

## 2022-05-24 PROCEDURE — 7100000011 HC PHASE II RECOVERY - ADDTL 15 MIN: Performed by: SURGERY

## 2022-05-24 PROCEDURE — 88305 TISSUE EXAM BY PATHOLOGIST: CPT

## 2022-05-24 PROCEDURE — 93005 ELECTROCARDIOGRAM TRACING: CPT

## 2022-05-24 PROCEDURE — 3700000000 HC ANESTHESIA ATTENDED CARE: Performed by: SURGERY

## 2022-05-24 PROCEDURE — 6370000000 HC RX 637 (ALT 250 FOR IP): Performed by: SURGERY

## 2022-05-24 PROCEDURE — 2500000003 HC RX 250 WO HCPCS: Performed by: SURGERY

## 2022-05-24 PROCEDURE — 7100000000 HC PACU RECOVERY - FIRST 15 MIN: Performed by: SURGERY

## 2022-05-24 PROCEDURE — 3700000001 HC ADD 15 MINUTES (ANESTHESIA): Performed by: SURGERY

## 2022-05-24 RX ORDER — MINERAL OIL
OIL (ML) MISCELLANEOUS PRN
Status: DISCONTINUED | OUTPATIENT
Start: 2022-05-24 | End: 2022-05-24 | Stop reason: ALTCHOICE

## 2022-05-24 RX ORDER — FENTANYL CITRATE 50 UG/ML
INJECTION, SOLUTION INTRAMUSCULAR; INTRAVENOUS PRN
Status: DISCONTINUED | OUTPATIENT
Start: 2022-05-24 | End: 2022-05-24 | Stop reason: SDUPTHER

## 2022-05-24 RX ORDER — SODIUM CHLORIDE 0.9 % (FLUSH) 0.9 %
5-40 SYRINGE (ML) INJECTION EVERY 12 HOURS SCHEDULED
Status: DISCONTINUED | OUTPATIENT
Start: 2022-05-24 | End: 2022-05-24 | Stop reason: HOSPADM

## 2022-05-24 RX ORDER — SODIUM CHLORIDE 9 MG/ML
INJECTION, SOLUTION INTRAVENOUS CONTINUOUS PRN
Status: DISCONTINUED | OUTPATIENT
Start: 2022-05-24 | End: 2022-05-24 | Stop reason: SDUPTHER

## 2022-05-24 RX ORDER — OXYCODONE HYDROCHLORIDE 5 MG/1
10 TABLET ORAL PRN
Status: DISCONTINUED | OUTPATIENT
Start: 2022-05-24 | End: 2022-05-24 | Stop reason: HOSPADM

## 2022-05-24 RX ORDER — EPHEDRINE SULFATE/0.9% NACL/PF 50 MG/5 ML
SYRINGE (ML) INTRAVENOUS PRN
Status: DISCONTINUED | OUTPATIENT
Start: 2022-05-24 | End: 2022-05-24 | Stop reason: SDUPTHER

## 2022-05-24 RX ORDER — SODIUM CHLORIDE, SODIUM LACTATE, POTASSIUM CHLORIDE, CALCIUM CHLORIDE 600; 310; 30; 20 MG/100ML; MG/100ML; MG/100ML; MG/100ML
INJECTION, SOLUTION INTRAVENOUS CONTINUOUS
Status: DISCONTINUED | OUTPATIENT
Start: 2022-05-24 | End: 2022-05-24 | Stop reason: HOSPADM

## 2022-05-24 RX ORDER — PROPOFOL 10 MG/ML
INJECTION, EMULSION INTRAVENOUS PRN
Status: DISCONTINUED | OUTPATIENT
Start: 2022-05-24 | End: 2022-05-24 | Stop reason: SDUPTHER

## 2022-05-24 RX ORDER — CEFAZOLIN SODIUM 1 G/3ML
INJECTION, POWDER, FOR SOLUTION INTRAMUSCULAR; INTRAVENOUS PRN
Status: DISCONTINUED | OUTPATIENT
Start: 2022-05-24 | End: 2022-05-24 | Stop reason: SDUPTHER

## 2022-05-24 RX ORDER — SODIUM CHLORIDE 9 MG/ML
INJECTION, SOLUTION INTRAVENOUS PRN
Status: DISCONTINUED | OUTPATIENT
Start: 2022-05-24 | End: 2022-05-24 | Stop reason: HOSPADM

## 2022-05-24 RX ORDER — ROCURONIUM BROMIDE 10 MG/ML
INJECTION, SOLUTION INTRAVENOUS PRN
Status: DISCONTINUED | OUTPATIENT
Start: 2022-05-24 | End: 2022-05-24 | Stop reason: SDUPTHER

## 2022-05-24 RX ORDER — GLYCOPYRROLATE 0.2 MG/ML
INJECTION INTRAMUSCULAR; INTRAVENOUS PRN
Status: DISCONTINUED | OUTPATIENT
Start: 2022-05-24 | End: 2022-05-24 | Stop reason: SDUPTHER

## 2022-05-24 RX ORDER — FENTANYL CITRATE 50 UG/ML
25 INJECTION, SOLUTION INTRAMUSCULAR; INTRAVENOUS EVERY 5 MIN PRN
Status: DISCONTINUED | OUTPATIENT
Start: 2022-05-24 | End: 2022-05-24 | Stop reason: HOSPADM

## 2022-05-24 RX ORDER — LIDOCAINE HYDROCHLORIDE 10 MG/ML
INJECTION, SOLUTION INFILTRATION; PERINEURAL PRN
Status: DISCONTINUED | OUTPATIENT
Start: 2022-05-24 | End: 2022-05-24 | Stop reason: ALTCHOICE

## 2022-05-24 RX ORDER — CEFAZOLIN SODIUM 1 G/3ML
INJECTION, POWDER, FOR SOLUTION INTRAMUSCULAR; INTRAVENOUS
Status: COMPLETED
Start: 2022-05-24 | End: 2022-05-24

## 2022-05-24 RX ORDER — ONDANSETRON 2 MG/ML
INJECTION INTRAMUSCULAR; INTRAVENOUS PRN
Status: DISCONTINUED | OUTPATIENT
Start: 2022-05-24 | End: 2022-05-24 | Stop reason: SDUPTHER

## 2022-05-24 RX ORDER — SODIUM CHLORIDE 0.9 % (FLUSH) 0.9 %
5-40 SYRINGE (ML) INJECTION PRN
Status: DISCONTINUED | OUTPATIENT
Start: 2022-05-24 | End: 2022-05-24 | Stop reason: HOSPADM

## 2022-05-24 RX ORDER — DEXAMETHASONE SODIUM PHOSPHATE 4 MG/ML
INJECTION, SOLUTION INTRA-ARTICULAR; INTRALESIONAL; INTRAMUSCULAR; INTRAVENOUS; SOFT TISSUE PRN
Status: DISCONTINUED | OUTPATIENT
Start: 2022-05-24 | End: 2022-05-24 | Stop reason: SDUPTHER

## 2022-05-24 RX ORDER — OXYCODONE HYDROCHLORIDE 5 MG/1
5 TABLET ORAL PRN
Status: DISCONTINUED | OUTPATIENT
Start: 2022-05-24 | End: 2022-05-24 | Stop reason: HOSPADM

## 2022-05-24 RX ORDER — OXYCODONE HYDROCHLORIDE 5 MG/1
5 TABLET ORAL EVERY 6 HOURS PRN
Qty: 20 TABLET | Refills: 0 | Status: SHIPPED | OUTPATIENT
Start: 2022-05-24 | End: 2022-05-29

## 2022-05-24 RX ORDER — NEOSTIGMINE METHYLSULFATE 1 MG/ML
INJECTION, SOLUTION INTRAVENOUS PRN
Status: DISCONTINUED | OUTPATIENT
Start: 2022-05-24 | End: 2022-05-24 | Stop reason: SDUPTHER

## 2022-05-24 RX ORDER — DIAPER,BRIEF,INFANT-TODD,DISP
EACH MISCELLANEOUS PRN
Status: DISCONTINUED | OUTPATIENT
Start: 2022-05-24 | End: 2022-05-24 | Stop reason: ALTCHOICE

## 2022-05-24 RX ADMIN — FENTANYL CITRATE 50 MCG: 50 INJECTION, SOLUTION INTRAMUSCULAR; INTRAVENOUS at 09:43

## 2022-05-24 RX ADMIN — SODIUM CHLORIDE: 9 INJECTION, SOLUTION INTRAVENOUS at 09:01

## 2022-05-24 RX ADMIN — Medication 10 MG: at 09:39

## 2022-05-24 RX ADMIN — DEXAMETHASONE SODIUM PHOSPHATE 10 MG: 4 INJECTION, SOLUTION INTRAMUSCULAR; INTRAVENOUS at 09:17

## 2022-05-24 RX ADMIN — PROPOFOL 120 MG: 10 INJECTION, EMULSION INTRAVENOUS at 09:08

## 2022-05-24 RX ADMIN — GLYCOPYRROLATE 0.6 MG: 0.2 INJECTION, SOLUTION INTRAMUSCULAR; INTRAVENOUS at 09:56

## 2022-05-24 RX ADMIN — SODIUM CHLORIDE: 9 INJECTION, SOLUTION INTRAVENOUS at 10:00

## 2022-05-24 RX ADMIN — CEFAZOLIN 3000 MG: 1 INJECTION, POWDER, FOR SOLUTION INTRAMUSCULAR; INTRAVENOUS at 09:06

## 2022-05-24 RX ADMIN — ROCURONIUM BROMIDE 30 MG: 50 INJECTION, SOLUTION INTRAVENOUS at 09:08

## 2022-05-24 RX ADMIN — ONDANSETRON 4 MG: 2 INJECTION INTRAMUSCULAR; INTRAVENOUS at 09:17

## 2022-05-24 RX ADMIN — Medication 3 MG: at 09:56

## 2022-05-24 RX ADMIN — FENTANYL CITRATE 50 MCG: 50 INJECTION, SOLUTION INTRAMUSCULAR; INTRAVENOUS at 09:08

## 2022-05-24 ASSESSMENT — ENCOUNTER SYMPTOMS: SHORTNESS OF BREATH: 1

## 2022-05-24 NOTE — ANESTHESIA PRE PROCEDURE
Department of Anesthesiology  Preprocedure Note       Name:  Jessica Antonio.   Age:  82 y.o.  :  1935                                          MRN:  [id number]         Date:  2022      Surgeon: Keo Magana):  Ray Clifton MD    Procedure: Procedure(s):  WIDE LOCAL EXCISION OF LEFT LOWER EXTREMITY WITH SKIN GRAFT    Medications prior to admission:   Prior to Admission medications    Medication Sig Start Date End Date Taking?  Authorizing Provider   rivaroxaban (XARELTO) 20 MG TABS tablet Take 20 mg by mouth Daily with supper Last dose 22   Yes Historical Provider, MD   furosemide (LASIX) 40 MG tablet Take 40 mg by mouth daily   Yes Historical Provider, MD   losartan (COZAAR) 100 MG tablet Take 100 mg by mouth daily   Yes Historical Provider, MD   NIFEdipine (ADALAT CC) 90 MG extended release tablet Take 90 mg by mouth daily   Yes Historical Provider, MD   hydrALAZINE (APRESOLINE) 10 MG tablet Take 10 mg by mouth 3 times daily   Yes Historical Provider, MD   insulin glargine (LANTUS) 100 UNIT/ML injection vial Inject 64 Units into the skin nightly Instructed to take 1/2 dose evening insulin, but no insulin after midnight   Yes Historical Provider, MD   Insulin Lispro (HUMALOG KWIKPEN SC) Inject into the skin 3 times daily (with meals) As directed per sliding scale   Yes Historical Provider, MD   OXYGEN Inhale 2 L into the lungs as needed   Yes Historical Provider, MD   cloNIDine (CATAPRES) 0.1 MG tablet Take 0.1 mg by mouth in the morning, at noon, and at bedtime     Historical Provider, MD   fenofibrate (TRICOR) 145 MG tablet Take 145 mg by mouth daily At night    Historical Provider, MD       Current medications:    Current Facility-Administered Medications   Medication Dose Route Frequency Provider Last Rate Last Admin    lactated ringers infusion   IntraVENous Continuous Ray Clifton MD        sodium chloride flush 0.9 % injection 5-40 mL  5-40 mL IntraVENous 2 times per day Ray Clifton MD  sodium chloride flush 0.9 % injection 5-40 mL  5-40 mL IntraVENous PRN Randall Aceves MD           Allergies: Allergies   Allergen Reactions    Simvastatin      Other reaction(s): CHEST PAINS       Problem List:  There is no problem list on file for this patient. Past Medical History:        Diagnosis Date    Cancer [facility])     Skin    History of COVID-19 2022    Hx of blood clots 2017    leg/PE    Hyperlipidemia     Hypertension     Kidney disease     Non-healing skin lesion     left lower leg    SOB (shortness of breath) on exertion     wears oxygen 2 L prn    Tremor of both hands     Type 2 diabetes mellitus without complication (HCC)        Past Surgical History:        Procedure Laterality Date    APPENDECTOMY      COLONOSCOPY      MOHS SURGERY         Social History:    Social History     Tobacco Use    Smoking status: Former Smoker     Quit date:      Years since quittin.4    Smokeless tobacco: Never Used    Tobacco comment: Smoked for about 40 years   Substance Use Topics    Alcohol use: Never                                Counseling given: Not Answered  Comment: Smoked for about 40 years      Vital Signs (Current):   Vitals:    22 1553 22 0745   BP:  (!) 210/95   Pulse:  91   Resp:  20   Temp:  97.9 °F (36.6 °C)   TempSrc:  Temporal   SpO2:  94%   Weight: (!) 323 lb (146.5 kg)    Height: 6' 1\" (1.854 m)                                               BP Readings from Last 3 Encounters:   22 (!) 210/95       NPO Status: Time of last liquid consumption:                         Time of last solid consumption:                         Date of last liquid consumption: 22                        Date of last solid food consumption: 22    BMI:   Wt Readings from Last 3 Encounters:   22 (!) 323 lb (146.5 kg)     Body mass index is 42.61 kg/m².     CBC: No results found for: WBC, RBC, HGB, HCT, MCV, RDW, PLT    CMP: No results found for: NA, K, CL, CO2, BUN, CREATININE, GFRAA, AGRATIO, LABGLOM, GLUCOSE, GLU, PROT, CALCIUM, BILITOT, ALKPHOS, AST, ALT    POC Tests: No results for input(s): POCGLU, POCNA, POCK, POCCL, POCBUN, POCHEMO, POCHCT in the last 72 hours. Coags: No results found for: PROTIME, INR, APTT    HCG (If Applicable): No results found for: PREGTESTUR, PREGSERUM, HCG, HCGQUANT     ABGs: No results found for: PHART, PO2ART, SWP3SIU, CHV5OAC, BEART, P6LNEHCW     Type & Screen (If Applicable):  No results found for: LABABO, LABRH    Drug/Infectious Status (If Applicable):  No results found for: HIV, HEPCAB    COVID-19 Screening (If Applicable): No results found for: COVID19        Anesthesia Evaluation  Patient summary reviewed no history of anesthetic complications:   Airway: Mallampati: III  TM distance: >3 FB   Neck ROM: full  Mouth opening: > = 3 FB   Dental:    (+) poor dentition  Comment: SEVERAL MISSING TEETH    Pulmonary: breath sounds clear to auscultation  (+) shortness of breath:                             Cardiovascular:    (+) hypertension:, hyperlipidemia    (-)  KNOX      Rhythm: regular             Beta Blocker:  Not on Beta Blocker         Neuro/Psych:   (+) neuromuscular disease (BENIGN TREMOR BOTH HANDS):,             GI/Hepatic/Renal:   (+) morbid obesity          Endo/Other:    (+) DiabetesType II DM, using insulin, malignancy/cancer (SKIN). Abdominal:             Vascular: negative vascular ROS. Other Findings:           Anesthesia Plan      general     ASA 3       Induction: intravenous. MIPS: Postoperative opioids intended and Prophylactic antiemetics administered. Anesthetic plan and risks discussed with patient. Plan discussed with CRNA.               [address]ou,    5/24/2022

## 2022-05-24 NOTE — PROGRESS NOTES
Updated Dr. Honey Lindsay on bp and oxygen level in stage 2. Patient has his portable oxygen in car. Ok to discharge.

## 2022-05-24 NOTE — ANESTHESIA POSTPROCEDURE EVALUATION
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 Deann Phase I: Deann Score: 9    Deann Phase II: Deann Score: 9    Last vitals: Reviewed and per EMR flowsheets.        Anesthesia Post Evaluation    Patient location during evaluation: PACU  Patient participation: complete - patient participated  Level of consciousness: awake and alert  Airway patency: patent  Nausea & Vomiting: no nausea and no vomiting  Complications: no  Cardiovascular status: hemodynamically stable  Respiratory status: acceptable  Hydration status: euvolemic

## 2022-05-24 NOTE — INTERVAL H&P NOTE
Update History & Physical    The patient's History and Physical of April 25, 2022 was reviewed with the patient and I examined the patient. There was no change. The surgical site was confirmed by the patient and me. Plan: The risks, benefits, expected outcome, and alternative to the recommended procedure have been discussed with the patient. Patient understands and wants to proceed with the procedure.      Electronically signed by Cecilia Dumas MD on 5/24/2022 at 8:38 AM

## 2022-05-24 NOTE — OP NOTE
Operative Note      Patient: Judith Perez Sr.  YOB: 1935  MRN: [id number]    Date of Procedure: 5/24/2022    Pre-Op Diagnosis: NEOPLASM UNCERTAIN ETIOLOGY OF SKIN LEFT LOWER LEG    Post-Op Diagnosis: Same       Procedure(s):  WIDE LOCAL EXCISION OF LEFT LOWER EXTREMITY WITH SKIN GRAFT WITH ABDOMEN DONOR SITE    Surgeon(s):  Skylar Fisher MD    Assistant:   Resident: Rock Zhane MD    Anesthesia: General    Estimated Blood Loss (mL): less than 50     Complications: None    Specimens:   ID Type Source Tests Collected by Time Destination   A : NEOPLASM OF LEFT LOWER LEG Tissue Tissue SURGICAL PATHOLOGY Skylar Fisher MD 5/24/2022 0930        Implants:  * No implants in log *      Drains: * No LDAs found *    Findings: left lower extremity neoplasm excised and STSG from LLQ donor; donor site excised and closed primarily    Detailed Description of Procedure: The patient was brought to the operating room and positioned supine on the operating room table. Sequential compression devices were placed on the patient's lower extremities and were powered on. General anesthesia was administered and preoperative antibiotics were administered per the anesthetic record. The patient was prepped and draped in the usual sterile fashion and the procedure went forth with strict aseptic technique under maximal barrier precautions. The left lower extremity was prepped with Betadine and the lower abdomen was prepped with ChloraPrep. Immediately prior to the procedure a time-out was called and the surgical checklist was reviewed and agreed upon by all present. At the left lower extremity a surgical incision was made around the neoplasm 1 cm from the lesion radially. This was performed with a 15 scalpel blade and carried down to the subcutaneous tissues with electrocautery. The neoplasm was amputated from the site and marked for orientation for pathologic examination.   This was performed with a Prolene suture marking superiorly with a short suture and laterally with a long suture. The wound bed was inspected and hemostasis was obtained with electrocautery. We then turned our attention towards split-thickness skin grafting. At the left lower quadrant mineral oil was applied. Using the dermatome we obtained a split-thickness skin graft of approximately 15 µm. We placed this through the mesher at a ratio of 1: 1.5. This was placed within the wound bed at the left lower leg. We needed more coverage so we took another piece of skin at the same place of the left lower quadrant using a dermatome in same manner we prepared the skin. Once we had these 2 pieces of tissue we were able to late within the wound bed and stapled in place for adequate coverage. We then applied bacitracin and then Xeroform gauze over the area. We wrapped the left lower extremity and Kerlix and then an Ace wrap. Turning back to the abdominal incision we decided to excise an ellipse of tissue that included the split-thickness skin graft harvesting site. This was performed with a 15 scalpel blade and was carried down to the subcutaneous tissues with electrocautery. The ellipse of skin was completely amputated and skin flaps were made at the superior and inferior aspects. Hemostasis was obtained within the wound bed with electrocautery. We then used 3-0 Vicryl suture to approximate the skin and then 4-0 Vicryl to run the skin closed in a subcuticular fashion. Dermabond was applied. The length of the incision made at the abdomen was 12 cm and was an intermediate level closure. The tissue removed from the left lower extremity included a surface area of 12 cm² in size. Counts were correct x2. Patient was brought to PACU after being awoken from general anesthesia. He is to leave the dressing on his left lower extremity for the entire time he is home until he presents for follow-up next week.     Dr. Isaac Pinzon was present for this procedure.     Electronically signed by Blanca Sweet MD on 5/24/2022 at 10:21 AM

## 2022-06-20 ENCOUNTER — HOSPITAL ENCOUNTER (OUTPATIENT)
Dept: HOSPITAL 83 - LAB | Age: 87
Discharge: HOME | End: 2022-06-20
Attending: INTERNAL MEDICINE
Payer: MEDICARE

## 2022-06-20 DIAGNOSIS — D50.9: ICD-10-CM

## 2022-06-20 DIAGNOSIS — E29.1: ICD-10-CM

## 2022-06-20 DIAGNOSIS — E11.65: Primary | ICD-10-CM

## 2022-06-20 DIAGNOSIS — E55.9: ICD-10-CM

## 2022-06-20 DIAGNOSIS — N40.0: ICD-10-CM

## 2022-06-20 DIAGNOSIS — E78.5: ICD-10-CM

## 2022-06-20 LAB
25(OH)D3 SERPL-MCNC: 24.7 NG/ML (ref 30–100)
ALP SERPL-CCNC: 78 U/L (ref 45–117)
ALT SERPL W P-5'-P-CCNC: 21 U/L (ref 12–78)
AST SERPL-CCNC: 13 IU/L (ref 3–35)
BACTERIA #/AREA URNS HPF: (no result) /[HPF]
BUN SERPL-MCNC: 26 MG/DL (ref 7–24)
CHLORIDE SERPL-SCNC: 106 MMOL/L (ref 98–107)
CHOLEST SERPL-MCNC: 170 MG/DL (ref ?–200)
CREAT SERPL-MCNC: 1.56 MG/DL (ref 0.7–1.3)
EPI CELLS #/AREA URNS HPF: (no result) /[HPF]
GLUCOSE UR QL: (no result)
IRON SERPL-MCNC: 49 UG/DL (ref 65–175)
LDLC SERPL DIRECT ASSAY-MCNC: 90 MG/DL (ref 9–159)
PH UR STRIP: 6.5 [PH] (ref 4.5–8)
POTASSIUM SERPL-SCNC: 4.4 MMOL/L (ref 3.5–5.1)
PROT SERPL-MCNC: 7.5 GM/DL (ref 6.4–8.2)
SODIUM SERPL-SCNC: 139 MMOL/L (ref 136–145)
SP GR UR: 1.01 (ref 1–1.03)
TESTOST SERPL-MCNC: 127 NG/DL (ref 241–827)
TIBC SERPL-MCNC: 364 UG/DL (ref 250–450)
TRIGL SERPL-MCNC: 98 MG/DL (ref ?–150)
UROBILINOGEN UR STRIP-MCNC: 0.2 E.U./DL (ref 0–1)

## 2022-10-09 ENCOUNTER — HOSPITAL ENCOUNTER (INPATIENT)
Dept: HOSPITAL 83 - ED | Age: 87
LOS: 4 days | Discharge: HOME HEALTH | DRG: 871 | End: 2022-10-13
Attending: INTERNAL MEDICINE | Admitting: INTERNAL MEDICINE
Payer: MEDICARE

## 2022-10-09 VITALS — SYSTOLIC BLOOD PRESSURE: 188 MMHG | DIASTOLIC BLOOD PRESSURE: 88 MMHG

## 2022-10-09 VITALS — WEIGHT: 315 LBS | HEIGHT: 73.98 IN | BODY MASS INDEX: 40.43 KG/M2

## 2022-10-09 VITALS — DIASTOLIC BLOOD PRESSURE: 79 MMHG

## 2022-10-09 VITALS — DIASTOLIC BLOOD PRESSURE: 66 MMHG | SYSTOLIC BLOOD PRESSURE: 173 MMHG

## 2022-10-09 VITALS — DIASTOLIC BLOOD PRESSURE: 61 MMHG

## 2022-10-09 VITALS — DIASTOLIC BLOOD PRESSURE: 76 MMHG

## 2022-10-09 VITALS — DIASTOLIC BLOOD PRESSURE: 77 MMHG

## 2022-10-09 VITALS — DIASTOLIC BLOOD PRESSURE: 84 MMHG

## 2022-10-09 DIAGNOSIS — E66.9: ICD-10-CM

## 2022-10-09 DIAGNOSIS — N18.32: ICD-10-CM

## 2022-10-09 DIAGNOSIS — E11.22: ICD-10-CM

## 2022-10-09 DIAGNOSIS — S80.212A: ICD-10-CM
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
## 2022-10-09 DIAGNOSIS — Z82.49: ICD-10-CM

## 2022-10-09 DIAGNOSIS — E11.65: ICD-10-CM

## 2022-10-09 DIAGNOSIS — Z20.822: ICD-10-CM

## 2022-10-09 DIAGNOSIS — Z86.711: ICD-10-CM

## 2022-10-09 DIAGNOSIS — Z82.3: ICD-10-CM

## 2022-10-09 DIAGNOSIS — F32.9: ICD-10-CM

## 2022-10-09 DIAGNOSIS — D64.9: ICD-10-CM
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
## 2022-10-09 DIAGNOSIS — S20.219A: ICD-10-CM

## 2022-10-09 DIAGNOSIS — J15.6: ICD-10-CM

## 2022-10-09 DIAGNOSIS — Z95.828: ICD-10-CM

## 2022-10-09 DIAGNOSIS — E87.20: ICD-10-CM

## 2022-10-09 DIAGNOSIS — S22.43XA: ICD-10-CM
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
## 2022-10-09 DIAGNOSIS — I12.9: ICD-10-CM

## 2022-10-09 DIAGNOSIS — A41.9: Primary | ICD-10-CM

## 2022-10-09 DIAGNOSIS — S80.211A: ICD-10-CM

## 2022-10-09 DIAGNOSIS — Z86.73: ICD-10-CM
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
## 2022-10-09 DIAGNOSIS — Z90.49: ICD-10-CM

## 2022-10-09 DIAGNOSIS — Z86.718: ICD-10-CM

## 2022-10-09 LAB
ALP SERPL-CCNC: 68 U/L (ref 45–117)
ALT SERPL W P-5'-P-CCNC: 29 U/L (ref 12–78)
APTT PPP: 26.1 SECONDS (ref 20–32.1)
AST SERPL-CCNC: 13 IU/L (ref 3–35)
BACTERIA #/AREA URNS HPF: (no result) /[HPF]
BASOPHILS # BLD AUTO: 0.1 10*3/UL (ref 0–0.1)
BASOPHILS NFR BLD AUTO: 0.8 % (ref 0–1)
BUN SERPL-MCNC: 46 MG/DL (ref 7–24)
CHLORIDE SERPL-SCNC: 106 MMOL/L (ref 98–107)
CREAT SERPL-MCNC: 1.91 MG/DL (ref 0.7–1.3)
EOSINOPHIL # BLD AUTO: 0.1 10*3/UL (ref 0–0.4)
EOSINOPHIL # BLD AUTO: 0.6 % (ref 1–4)
EPI CELLS #/AREA URNS HPF: (no result) /[HPF]
ERYTHROCYTE [DISTWIDTH] IN BLOOD BY AUTOMATED COUNT: 15 % (ref 0–14.5)
GLUCOSE UR QL: (no result)
HCT VFR BLD AUTO: 41 % (ref 42–52)
INR BLD: 1.1 (ref 2–3.5)
LYMPHOCYTES # BLD AUTO: 1.6 10*3/UL (ref 1.3–4.4)
LYMPHOCYTES NFR BLD AUTO: 10.1 % (ref 27–41)
MCH RBC QN AUTO: 27.5 PG (ref 27–31)
MCHC RBC AUTO-ENTMCNC: 31 G/DL (ref 33–37)
MCV RBC AUTO: 88.7 FL (ref 80–94)
MONOCYTES # BLD AUTO: 0.9 10*3/UL (ref 0.1–1)
MONOCYTES NFR BLD MANUAL: 6.1 % (ref 3–9)
NEUT #: 12.5 10*3/UL (ref 2.3–7.9)
NEUT %: 81.4 % (ref 47–73)
NRBC BLD QL AUTO: 0 % (ref 0–0)
PH UR STRIP: 6.5 [PH] (ref 4.5–8)
PLATELET # BLD AUTO: 395 10*3/UL (ref 130–400)
PMV BLD AUTO: 9.8 FL (ref 9.6–12.3)
POTASSIUM SERPL-SCNC: 3.8 MMOL/L (ref 3.5–5.1)
PROT SERPL-MCNC: 7.6 GM/DL (ref 6.4–8.2)
RBC # BLD AUTO: 4.62 10*6/UL (ref 4.5–5.9)
RBC #/AREA URNS HPF: (no result) RBC/HPF (ref 0–2)
SODIUM SERPL-SCNC: 139 MMOL/L (ref 136–145)
SP GR UR: 1.02 (ref 1–1.03)
UROBILINOGEN UR STRIP-MCNC: 0.2 E.U./DL (ref 0–1)
WBC #/AREA URNS HPF: (no result) WBC/HPF (ref 0–5)
WBC NRBC COR # BLD AUTO: 15.4 10*3/UL (ref 4.8–10.8)

## 2022-10-10 VITALS — SYSTOLIC BLOOD PRESSURE: 168 MMHG | DIASTOLIC BLOOD PRESSURE: 79 MMHG

## 2022-10-10 VITALS — DIASTOLIC BLOOD PRESSURE: 48 MMHG | SYSTOLIC BLOOD PRESSURE: 118 MMHG

## 2022-10-10 VITALS — SYSTOLIC BLOOD PRESSURE: 163 MMHG | DIASTOLIC BLOOD PRESSURE: 65 MMHG

## 2022-10-10 VITALS — SYSTOLIC BLOOD PRESSURE: 185 MMHG | DIASTOLIC BLOOD PRESSURE: 79 MMHG

## 2022-10-10 VITALS — SYSTOLIC BLOOD PRESSURE: 152 MMHG | DIASTOLIC BLOOD PRESSURE: 92 MMHG

## 2022-10-10 VITALS — DIASTOLIC BLOOD PRESSURE: 83 MMHG

## 2022-10-10 LAB
25(OH)D3 SERPL-MCNC: 19.5 NG/ML (ref 30–100)
ALP SERPL-CCNC: 49 U/L (ref 45–117)
ALT SERPL W P-5'-P-CCNC: 22 U/L (ref 12–78)
APTT PPP: 26.7 SECONDS (ref 20–32.1)
AST SERPL-CCNC: 14 IU/L (ref 3–35)
BASOPHILS # BLD AUTO: 0.1 10*3/UL (ref 0–0.1)
BASOPHILS NFR BLD AUTO: 0.7 % (ref 0–1)
BUN SERPL-MCNC: 30 MG/DL (ref 7–24)
CHLORIDE SERPL-SCNC: 111 MMOL/L (ref 98–107)
CREAT SERPL-MCNC: 1.55 MG/DL (ref 0.7–1.3)
EOSINOPHIL # BLD AUTO: 0.3 10*3/UL (ref 0–0.4)
EOSINOPHIL # BLD AUTO: 2.4 % (ref 1–4)
ERYTHROCYTE [DISTWIDTH] IN BLOOD BY AUTOMATED COUNT: 15.1 % (ref 0–14.5)
HCT VFR BLD AUTO: 38.9 % (ref 42–52)
INR BLD: 1.1 (ref 2–3.5)
LYMPHOCYTES # BLD AUTO: 1.9 10*3/UL (ref 1.3–4.4)
LYMPHOCYTES NFR BLD AUTO: 16.8 % (ref 27–41)
MCH RBC QN AUTO: 27.6 PG (ref 27–31)
MCHC RBC AUTO-ENTMCNC: 30.6 G/DL (ref 33–37)
MCV RBC AUTO: 90.3 FL (ref 80–94)
MONOCYTES # BLD AUTO: 0.9 10*3/UL (ref 0.1–1)
MONOCYTES NFR BLD MANUAL: 7.9 % (ref 3–9)
NEUT #: 8.1 10*3/UL (ref 2.3–7.9)
NEUT %: 71.9 % (ref 47–73)
NRBC BLD QL AUTO: 0 % (ref 0–0)
PLATELET # BLD AUTO: 326 10*3/UL (ref 130–400)
PMV BLD AUTO: 10 FL (ref 9.6–12.3)
POTASSIUM SERPL-SCNC: 4.5 MMOL/L (ref 3.5–5.1)
PROT SERPL-MCNC: 6.7 GM/DL (ref 6.4–8.2)
RBC # BLD AUTO: 4.31 10*6/UL (ref 4.5–5.9)
SODIUM SERPL-SCNC: 143 MMOL/L (ref 136–145)
T4 FREE SERPL-MCNC: 1.27 NG/DL (ref 0.76–1.46)
TSH SERPL DL<=0.005 MIU/L-ACNC: 2.46 UIU/ML (ref 0.36–4.75)
VITAMIN B12: 287 PG/ML (ref 247–911)
WBC NRBC COR # BLD AUTO: 11.2 10*3/UL (ref 4.8–10.8)

## 2022-10-11 VITALS — DIASTOLIC BLOOD PRESSURE: 70 MMHG | SYSTOLIC BLOOD PRESSURE: 158 MMHG

## 2022-10-11 VITALS — DIASTOLIC BLOOD PRESSURE: 70 MMHG | SYSTOLIC BLOOD PRESSURE: 169 MMHG

## 2022-10-11 VITALS — SYSTOLIC BLOOD PRESSURE: 161 MMHG | DIASTOLIC BLOOD PRESSURE: 68 MMHG

## 2022-10-11 VITALS — DIASTOLIC BLOOD PRESSURE: 44 MMHG

## 2022-10-11 VITALS — DIASTOLIC BLOOD PRESSURE: 51 MMHG

## 2022-10-11 LAB
ALP SERPL-CCNC: 50 U/L (ref 45–117)
ALT SERPL W P-5'-P-CCNC: 24 U/L (ref 12–78)
AST SERPL-CCNC: 15 IU/L (ref 3–35)
BASOPHILS # BLD AUTO: 0.1 10*3/UL (ref 0–0.1)
BASOPHILS NFR BLD AUTO: 0.8 % (ref 0–1)
BUN SERPL-MCNC: 29 MG/DL (ref 7–24)
CHLORIDE SERPL-SCNC: 108 MMOL/L (ref 98–107)
CREAT SERPL-MCNC: 1.61 MG/DL (ref 0.7–1.3)
EOSINOPHIL # BLD AUTO: 0.4 10*3/UL (ref 0–0.4)
EOSINOPHIL # BLD AUTO: 3.6 % (ref 1–4)
ERYTHROCYTE [DISTWIDTH] IN BLOOD BY AUTOMATED COUNT: 15.3 % (ref 0–14.5)
HCT VFR BLD AUTO: 38.5 % (ref 42–52)
LYMPHOCYTES # BLD AUTO: 2.1 10*3/UL (ref 1.3–4.4)
LYMPHOCYTES NFR BLD AUTO: 19.2 % (ref 27–41)
MCH RBC QN AUTO: 27.5 PG (ref 27–31)
MCHC RBC AUTO-ENTMCNC: 30.4 G/DL (ref 33–37)
MCV RBC AUTO: 90.4 FL (ref 80–94)
MONOCYTES # BLD AUTO: 1.1 10*3/UL (ref 0.1–1)
MONOCYTES NFR BLD MANUAL: 10 % (ref 3–9)
NEUT #: 7.1 10*3/UL (ref 2.3–7.9)
NEUT %: 65.9 % (ref 47–73)
NRBC BLD QL AUTO: 0 % (ref 0–0)
PLATELET # BLD AUTO: 337 10*3/UL (ref 130–400)
PMV BLD AUTO: 10.3 FL (ref 9.6–12.3)
POTASSIUM SERPL-SCNC: 4.1 MMOL/L (ref 3.5–5.1)
PROT SERPL-MCNC: 7.1 GM/DL (ref 6.4–8.2)
RBC # BLD AUTO: 4.26 10*6/UL (ref 4.5–5.9)
SODIUM SERPL-SCNC: 140 MMOL/L (ref 136–145)
WBC NRBC COR # BLD AUTO: 10.7 10*3/UL (ref 4.8–10.8)

## 2022-10-12 VITALS — DIASTOLIC BLOOD PRESSURE: 54 MMHG | SYSTOLIC BLOOD PRESSURE: 127 MMHG

## 2022-10-12 VITALS — DIASTOLIC BLOOD PRESSURE: 63 MMHG

## 2022-10-12 VITALS — SYSTOLIC BLOOD PRESSURE: 138 MMHG | DIASTOLIC BLOOD PRESSURE: 66 MMHG

## 2022-10-12 VITALS — DIASTOLIC BLOOD PRESSURE: 47 MMHG | SYSTOLIC BLOOD PRESSURE: 109 MMHG

## 2022-10-12 VITALS — DIASTOLIC BLOOD PRESSURE: 49 MMHG

## 2022-10-12 LAB
BASOPHILS # BLD AUTO: 0.1 10*3/UL (ref 0–0.1)
BASOPHILS NFR BLD AUTO: 0.7 % (ref 0–1)
BUN SERPL-MCNC: 35 MG/DL (ref 7–24)
CHLORIDE SERPL-SCNC: 107 MMOL/L (ref 98–107)
CREAT SERPL-MCNC: 1.86 MG/DL (ref 0.7–1.3)
EOSINOPHIL # BLD AUTO: 0.4 10*3/UL (ref 0–0.4)
EOSINOPHIL # BLD AUTO: 3.3 % (ref 1–4)
ERYTHROCYTE [DISTWIDTH] IN BLOOD BY AUTOMATED COUNT: 15 % (ref 0–14.5)
HCT VFR BLD AUTO: 37.3 % (ref 42–52)
LYMPHOCYTES # BLD AUTO: 2.2 10*3/UL (ref 1.3–4.4)
LYMPHOCYTES NFR BLD AUTO: 19.4 % (ref 27–41)
MCH RBC QN AUTO: 27.6 PG (ref 27–31)
MCHC RBC AUTO-ENTMCNC: 30.8 G/DL (ref 33–37)
MCV RBC AUTO: 89.4 FL (ref 80–94)
MONOCYTES # BLD AUTO: 0.9 10*3/UL (ref 0.1–1)
MONOCYTES NFR BLD MANUAL: 8.1 % (ref 3–9)
NEUT #: 7.9 10*3/UL (ref 2.3–7.9)
NEUT %: 68.2 % (ref 47–73)
NRBC BLD QL AUTO: 0 % (ref 0–0)
PLATELET # BLD AUTO: 347 10*3/UL (ref 130–400)
PMV BLD AUTO: 10.6 FL (ref 9.6–12.3)
POTASSIUM SERPL-SCNC: 4.6 MMOL/L (ref 3.5–5.1)
RBC # BLD AUTO: 4.17 10*6/UL (ref 4.5–5.9)
SODIUM SERPL-SCNC: 138 MMOL/L (ref 136–145)
WBC NRBC COR # BLD AUTO: 11.6 10*3/UL (ref 4.8–10.8)

## 2022-10-13 VITALS — SYSTOLIC BLOOD PRESSURE: 132 MMHG | DIASTOLIC BLOOD PRESSURE: 61 MMHG

## 2022-10-13 VITALS — DIASTOLIC BLOOD PRESSURE: 65 MMHG | SYSTOLIC BLOOD PRESSURE: 140 MMHG

## 2022-10-13 VITALS — DIASTOLIC BLOOD PRESSURE: 62 MMHG | SYSTOLIC BLOOD PRESSURE: 140 MMHG

## 2022-10-13 LAB
BUN SERPL-MCNC: 37 MG/DL (ref 7–24)
CHLORIDE SERPL-SCNC: 107 MMOL/L (ref 98–107)
CREAT SERPL-MCNC: 1.74 MG/DL (ref 0.7–1.3)
POTASSIUM SERPL-SCNC: 4.1 MMOL/L (ref 3.5–5.1)
SODIUM SERPL-SCNC: 138 MMOL/L (ref 136–145)

## 2022-11-22 ENCOUNTER — HOSPITAL ENCOUNTER (OUTPATIENT)
Dept: HOSPITAL 83 - LAB | Age: 87
End: 2022-11-22
Attending: INTERNAL MEDICINE
Payer: MEDICARE

## 2022-11-22 DIAGNOSIS — E55.9: ICD-10-CM

## 2022-11-22 DIAGNOSIS — N40.0: ICD-10-CM

## 2022-11-22 DIAGNOSIS — E11.65: Primary | ICD-10-CM

## 2022-11-22 DIAGNOSIS — D50.9: ICD-10-CM

## 2022-11-22 DIAGNOSIS — E78.5: ICD-10-CM

## 2022-11-22 LAB
25(OH)D3 SERPL-MCNC: 16.8 NG/ML (ref 30–100)
ALP SERPL-CCNC: 90 U/L (ref 45–117)
ALT SERPL W P-5'-P-CCNC: 31 U/L (ref 12–78)
BACTERIA #/AREA URNS HPF: (no result) /[HPF]
BUN SERPL-MCNC: 35 MG/DL (ref 7–24)
CHLORIDE SERPL-SCNC: 105 MMOL/L (ref 98–107)
CHOLEST SERPL-MCNC: 194 MG/DL (ref ?–200)
CREAT SERPL-MCNC: 1.88 MG/DL (ref 0.7–1.3)
EPI CELLS #/AREA URNS HPF: (no result) /[HPF]
GLUCOSE UR QL: (no result)
IRON SERPL-MCNC: 67 UG/DL (ref 65–175)
LDLC SERPL DIRECT ASSAY-MCNC: 109 MG/DL (ref 9–159)
PH UR STRIP: 6.5 [PH] (ref 4.5–8)
POTASSIUM SERPL-SCNC: 4.4 MMOL/L (ref 3.5–5.1)
PROT SERPL-MCNC: 8.4 GM/DL (ref 6.4–8.2)
RBC #/AREA URNS HPF: (no result) RBC/HPF (ref 0–2)
SODIUM SERPL-SCNC: 139 MMOL/L (ref 136–145)
SP GR UR: 1.01 (ref 1–1.03)
TESTOST SERPL-MCNC: 352 NG/DL (ref 241–827)
TRIGL SERPL-MCNC: 139 MG/DL (ref ?–150)
UROBILINOGEN UR STRIP-MCNC: 0.2 E.U./DL (ref 0–1)
WBC #/AREA URNS HPF: (no result) WBC/HPF (ref 0–5)

## 2023-02-19 ENCOUNTER — HOSPITAL ENCOUNTER (INPATIENT)
Dept: HOSPITAL 83 - ED | Age: 88
LOS: 3 days | Discharge: NURSING, REHAB OR LONG-TERM CARE | DRG: 683 | End: 2023-02-22
Attending: STUDENT IN AN ORGANIZED HEALTH CARE EDUCATION/TRAINING PROGRAM | Admitting: STUDENT IN AN ORGANIZED HEALTH CARE EDUCATION/TRAINING PROGRAM
Payer: MEDICARE

## 2023-02-19 VITALS — HEIGHT: 74 IN | BODY MASS INDEX: 40.43 KG/M2 | WEIGHT: 315 LBS

## 2023-02-19 VITALS — DIASTOLIC BLOOD PRESSURE: 55 MMHG

## 2023-02-19 VITALS — DIASTOLIC BLOOD PRESSURE: 91 MMHG

## 2023-02-19 DIAGNOSIS — Z79.4: ICD-10-CM

## 2023-02-19 DIAGNOSIS — N18.32: ICD-10-CM

## 2023-02-19 DIAGNOSIS — Z82.49: ICD-10-CM

## 2023-02-19 DIAGNOSIS — D75.839: ICD-10-CM

## 2023-02-19 DIAGNOSIS — E11.22: ICD-10-CM

## 2023-02-19 DIAGNOSIS — N17.0: Primary | ICD-10-CM

## 2023-02-19 DIAGNOSIS — Z82.3: ICD-10-CM

## 2023-02-19 DIAGNOSIS — D72.825: ICD-10-CM

## 2023-02-19 DIAGNOSIS — Z95.828: ICD-10-CM

## 2023-02-19 DIAGNOSIS — S81.011A: ICD-10-CM

## 2023-02-19 DIAGNOSIS — D64.9: ICD-10-CM

## 2023-02-19 DIAGNOSIS — E11.65: ICD-10-CM

## 2023-02-19 DIAGNOSIS — Z86.73: ICD-10-CM

## 2023-02-19 DIAGNOSIS — S51.012A: ICD-10-CM

## 2023-02-19 DIAGNOSIS — Z90.49: ICD-10-CM

## 2023-02-19 DIAGNOSIS — Z86.718: ICD-10-CM

## 2023-02-19 DIAGNOSIS — Z86.711: ICD-10-CM

## 2023-02-19 DIAGNOSIS — I12.9: ICD-10-CM

## 2023-02-19 LAB
ALP SERPL-CCNC: 57 U/L (ref 46–116)
ALT SERPL W P-5'-P-CCNC: 29 U/L (ref 10–49)
BASOPHILS # BLD AUTO: 0.1 10*3/UL (ref 0–0.1)
BASOPHILS NFR BLD AUTO: 0.5 % (ref 0–1)
BUN SERPL-MCNC: 51 MG/DL (ref 9–23)
CHLORIDE SERPL-SCNC: 99 MMOL/L (ref 98–107)
EOSINOPHIL # BLD AUTO: 0.1 10*3/UL (ref 0–0.4)
EOSINOPHIL # BLD AUTO: 0.6 % (ref 1–4)
ERYTHROCYTE [DISTWIDTH] IN BLOOD BY AUTOMATED COUNT: 13.8 % (ref 0–14.5)
HCT VFR BLD AUTO: 37.1 % (ref 42–52)
LYMPHOCYTES # BLD AUTO: 1.4 10*3/UL (ref 1.3–4.4)
LYMPHOCYTES NFR BLD AUTO: 8.2 % (ref 27–41)
MCH RBC QN AUTO: 28.5 PG (ref 27–31)
MCHC RBC AUTO-ENTMCNC: 31.5 G/DL (ref 33–37)
MCV RBC AUTO: 90.3 FL (ref 80–94)
MONOCYTES # BLD AUTO: 1.1 10*3/UL (ref 0.1–1)
MONOCYTES NFR BLD MANUAL: 6.5 % (ref 3–9)
NEUT #: 14.3 10*3/UL (ref 2.3–7.9)
NEUT %: 83.6 % (ref 47–73)
NRBC BLD QL AUTO: 0 % (ref 0–0)
PLATELET # BLD AUTO: 463 10*3/UL (ref 130–400)
PMV BLD AUTO: 9.4 FL (ref 9.6–12.3)
POTASSIUM SERPL-SCNC: 4.3 MMOL/L (ref 3.4–5.1)
PROT SERPL-MCNC: 7.3 GM/DL (ref 6–8)
RBC # BLD AUTO: 4.11 10*6/UL (ref 4.5–5.9)
WBC NRBC COR # BLD AUTO: 17.1 10*3/UL (ref 4.8–10.8)

## 2023-02-20 VITALS — SYSTOLIC BLOOD PRESSURE: 166 MMHG | DIASTOLIC BLOOD PRESSURE: 73 MMHG

## 2023-02-20 VITALS — DIASTOLIC BLOOD PRESSURE: 76 MMHG | SYSTOLIC BLOOD PRESSURE: 172 MMHG

## 2023-02-20 VITALS — DIASTOLIC BLOOD PRESSURE: 67 MMHG | SYSTOLIC BLOOD PRESSURE: 159 MMHG

## 2023-02-20 VITALS — SYSTOLIC BLOOD PRESSURE: 138 MMHG | DIASTOLIC BLOOD PRESSURE: 58 MMHG

## 2023-02-20 VITALS — DIASTOLIC BLOOD PRESSURE: 76 MMHG

## 2023-02-20 VITALS — DIASTOLIC BLOOD PRESSURE: 65 MMHG

## 2023-02-20 VITALS — DIASTOLIC BLOOD PRESSURE: 59 MMHG

## 2023-02-20 VITALS — DIASTOLIC BLOOD PRESSURE: 68 MMHG

## 2023-02-20 LAB
ALP SERPL-CCNC: 56 U/L (ref 46–116)
ALT SERPL W P-5'-P-CCNC: 28 U/L (ref 10–49)
BACTERIA #/AREA URNS HPF: (no result) /[HPF]
BASOPHILS # BLD AUTO: 0.1 10*3/UL (ref 0–0.1)
BASOPHILS NFR BLD AUTO: 0.5 % (ref 0–1)
BUN SERPL-MCNC: 42 MG/DL (ref 9–23)
CASTS URNS QL MICRO: (no result)
CHLORIDE SERPL-SCNC: 101 MMOL/L (ref 98–107)
EOSINOPHIL # BLD AUTO: 0.1 10*3/UL (ref 0–0.4)
EOSINOPHIL # BLD AUTO: 0.5 % (ref 1–4)
EPI CELLS #/AREA URNS HPF: (no result) /[HPF]
ERYTHROCYTE [DISTWIDTH] IN BLOOD BY AUTOMATED COUNT: 14 % (ref 0–14.5)
GLUCOSE UR QL: (no result)
HCT VFR BLD AUTO: 34.8 % (ref 42–52)
LYMPHOCYTES # BLD AUTO: 1.7 10*3/UL (ref 1.3–4.4)
LYMPHOCYTES NFR BLD AUTO: 13.4 % (ref 27–41)
MCH RBC QN AUTO: 28.7 PG (ref 27–31)
MCHC RBC AUTO-ENTMCNC: 31.9 G/DL (ref 33–37)
MCV RBC AUTO: 89.9 FL (ref 80–94)
MONOCYTES # BLD AUTO: 0.7 10*3/UL (ref 0.1–1)
MONOCYTES NFR BLD MANUAL: 5.7 % (ref 3–9)
MUCOUS THREADS URNS QL MICRO: (no result)
NEUT #: 10.3 10*3/UL (ref 2.3–7.9)
NEUT %: 79.5 % (ref 47–73)
NRBC BLD QL AUTO: 0 % (ref 0–0)
PH UR STRIP: 6 [PH] (ref 4.5–8)
PLATELET # BLD AUTO: 451 10*3/UL (ref 130–400)
PMV BLD AUTO: 9.8 FL (ref 9.6–12.3)
POTASSIUM SERPL-SCNC: 4.6 MMOL/L (ref 3.4–5.1)
PROT SERPL-MCNC: 6.9 GM/DL (ref 6–8)
RBC # BLD AUTO: 3.87 10*6/UL (ref 4.5–5.9)
RBC #/AREA URNS HPF: (no result) RBC/HPF (ref 0–2)
SP GR UR: 1.01 (ref 1–1.03)
T4 FREE SERPL-MCNC: 1.35 NG/DL (ref 0.89–1.76)
TSH SERPL DL<=0.005 MIU/L-ACNC: 2.05 UIU/ML (ref 0.55–4.78)
UROBILINOGEN UR STRIP-MCNC: 0.2 E.U./DL (ref 0–1)
VITAMIN B12: 293 PG/ML (ref 211–911)
WBC #/AREA URNS HPF: (no result) WBC/HPF (ref 0–5)
WBC NRBC COR # BLD AUTO: 13 10*3/UL (ref 4.8–10.8)

## 2023-02-21 VITALS — DIASTOLIC BLOOD PRESSURE: 66 MMHG

## 2023-02-21 VITALS — SYSTOLIC BLOOD PRESSURE: 164 MMHG | DIASTOLIC BLOOD PRESSURE: 63 MMHG

## 2023-02-21 VITALS — DIASTOLIC BLOOD PRESSURE: 59 MMHG

## 2023-02-21 VITALS — SYSTOLIC BLOOD PRESSURE: 193 MMHG | DIASTOLIC BLOOD PRESSURE: 82 MMHG

## 2023-02-21 VITALS — DIASTOLIC BLOOD PRESSURE: 64 MMHG

## 2023-02-21 LAB
BASOPHILS # BLD AUTO: 0.1 10*3/UL (ref 0–0.1)
BASOPHILS NFR BLD AUTO: 0.9 % (ref 0–1)
BUN SERPL-MCNC: 25 MG/DL (ref 9–23)
CHLORIDE SERPL-SCNC: 106 MMOL/L (ref 98–107)
EOSINOPHIL # BLD AUTO: 0.3 10*3/UL (ref 0–0.4)
EOSINOPHIL # BLD AUTO: 3.5 % (ref 1–4)
ERYTHROCYTE [DISTWIDTH] IN BLOOD BY AUTOMATED COUNT: 14 % (ref 0–14.5)
HCT VFR BLD AUTO: 36 % (ref 42–52)
LYMPHOCYTES # BLD AUTO: 1.9 10*3/UL (ref 1.3–4.4)
LYMPHOCYTES NFR BLD AUTO: 23.4 % (ref 27–41)
MCH RBC QN AUTO: 27.9 PG (ref 27–31)
MCHC RBC AUTO-ENTMCNC: 30.3 G/DL (ref 33–37)
MCV RBC AUTO: 92.3 FL (ref 80–94)
MONOCYTES # BLD AUTO: 0.8 10*3/UL (ref 0.1–1)
MONOCYTES NFR BLD MANUAL: 9.5 % (ref 3–9)
NEUT #: 5 10*3/UL (ref 2.3–7.9)
NEUT %: 62.5 % (ref 47–73)
NRBC BLD QL AUTO: 0 10*3/UL (ref 0–0)
PLATELET # BLD AUTO: 391 10*3/UL (ref 130–400)
PMV BLD AUTO: 10.2 FL (ref 9.6–12.3)
POTASSIUM SERPL-SCNC: 4.4 MMOL/L (ref 3.4–5.1)
RBC # BLD AUTO: 3.9 10*6/UL (ref 4.5–5.9)
WBC NRBC COR # BLD AUTO: 8.1 10*3/UL (ref 4.8–10.8)

## 2023-02-22 VITALS — DIASTOLIC BLOOD PRESSURE: 63 MMHG | SYSTOLIC BLOOD PRESSURE: 146 MMHG

## 2023-02-22 VITALS — SYSTOLIC BLOOD PRESSURE: 157 MMHG | DIASTOLIC BLOOD PRESSURE: 62 MMHG

## 2023-02-22 VITALS — DIASTOLIC BLOOD PRESSURE: 66 MMHG

## 2023-02-22 LAB
BASOPHILS # BLD AUTO: 0.1 10*3/UL (ref 0–0.1)
BASOPHILS NFR BLD AUTO: 1.1 % (ref 0–1)
BUN SERPL-MCNC: 27 MG/DL (ref 9–23)
CHLORIDE SERPL-SCNC: 103 MMOL/L (ref 98–107)
EOSINOPHIL # BLD AUTO: 0.3 10*3/UL (ref 0–0.4)
EOSINOPHIL # BLD AUTO: 3.4 % (ref 1–4)
ERYTHROCYTE [DISTWIDTH] IN BLOOD BY AUTOMATED COUNT: 13.8 % (ref 0–14.5)
HCT VFR BLD AUTO: 33.9 % (ref 42–52)
LYMPHOCYTES # BLD AUTO: 1.9 10*3/UL (ref 1.3–4.4)
LYMPHOCYTES NFR BLD AUTO: 23.5 % (ref 27–41)
MCH RBC QN AUTO: 28.7 PG (ref 27–31)
MCHC RBC AUTO-ENTMCNC: 31.9 G/DL (ref 33–37)
MCV RBC AUTO: 90.2 FL (ref 80–94)
MONOCYTES # BLD AUTO: 0.8 10*3/UL (ref 0.1–1)
MONOCYTES NFR BLD MANUAL: 9.1 % (ref 3–9)
NEUT #: 5.1 10*3/UL (ref 2.3–7.9)
NEUT %: 62.5 % (ref 47–73)
NRBC BLD QL AUTO: 0 10*3/UL (ref 0–0)
PLATELET # BLD AUTO: 382 10*3/UL (ref 130–400)
PMV BLD AUTO: 9.5 FL (ref 9.6–12.3)
POTASSIUM SERPL-SCNC: 4.7 MMOL/L (ref 3.4–5.1)
RBC # BLD AUTO: 3.76 10*6/UL (ref 4.5–5.9)
WBC NRBC COR # BLD AUTO: 8.2 10*3/UL (ref 4.8–10.8)

## 2023-08-07 ENCOUNTER — HOSPITAL ENCOUNTER (OUTPATIENT)
Dept: HOSPITAL 83 - LAB | Age: 88
Discharge: HOME | End: 2023-08-07
Attending: INTERNAL MEDICINE
Payer: MEDICARE

## 2023-08-07 DIAGNOSIS — E55.9: ICD-10-CM

## 2023-08-07 DIAGNOSIS — E11.65: Primary | ICD-10-CM

## 2023-08-07 DIAGNOSIS — E78.5: ICD-10-CM

## 2023-08-07 LAB
25(OH)D3 SERPL-MCNC: 31.3 NG/ML (ref 30–100)
ALP SERPL-CCNC: 64 U/L (ref 46–116)
ALT SERPL W P-5'-P-CCNC: 21 U/L (ref 10–49)
BACTERIA #/AREA URNS HPF: (no result) /[HPF]
BUN SERPL-MCNC: 40 MG/DL (ref 9–23)
CHLORIDE SERPL-SCNC: 99 MMOL/L (ref 98–107)
CHOLEST SERPL-MCNC: 171 MG/DL (ref ?–200)
LDLC SERPL DIRECT ASSAY-MCNC: 96 MG/DL (ref 9–159)
MUCOUS THREADS URNS QL MICRO: (no result)
PH UR STRIP: 5 [PH] (ref 4.5–8)
POTASSIUM SERPL-SCNC: 4.6 MMOL/L (ref 3.4–5.1)
PROT SERPL-MCNC: 7.7 GM/DL (ref 6–8)
RBC #/AREA URNS HPF: (no result) RBC/HPF (ref 0–2)
SP GR UR: 1.01 (ref 1–1.03)
TESTOST SERPL-MCNC: 278 NG/DL (ref 113–882)
TRIGL SERPL-MCNC: 110 MG/DL (ref ?–150)
UROBILINOGEN UR STRIP-MCNC: 1 E.U./DL (ref 0–1)
WBC #/AREA URNS HPF: (no result) WBC/HPF (ref 0–5)

## 2023-12-12 ENCOUNTER — HOSPITAL ENCOUNTER (EMERGENCY)
Dept: HOSPITAL 83 - ED | Age: 88
Discharge: HOME | End: 2023-12-12
Payer: MEDICARE

## 2023-12-12 VITALS — HEIGHT: 73.98 IN | BODY MASS INDEX: 40.43 KG/M2 | WEIGHT: 315 LBS

## 2023-12-12 DIAGNOSIS — I50.9: ICD-10-CM

## 2023-12-12 DIAGNOSIS — F41.9: ICD-10-CM

## 2023-12-12 DIAGNOSIS — M51.16: Primary | ICD-10-CM

## 2023-12-12 DIAGNOSIS — Z98.890: ICD-10-CM

## 2023-12-12 DIAGNOSIS — Z86.718: ICD-10-CM

## 2023-12-12 DIAGNOSIS — Z95.5: ICD-10-CM

## 2023-12-12 DIAGNOSIS — I11.0: ICD-10-CM

## 2023-12-12 DIAGNOSIS — R26.2: ICD-10-CM

## 2023-12-12 DIAGNOSIS — E11.9: ICD-10-CM

## 2023-12-12 DIAGNOSIS — E78.00: ICD-10-CM

## 2023-12-12 DIAGNOSIS — F17.210: ICD-10-CM

## 2023-12-12 DIAGNOSIS — Z90.49: ICD-10-CM

## 2023-12-12 DIAGNOSIS — Z20.822: ICD-10-CM

## 2023-12-12 LAB
ALP SERPL-CCNC: 65 U/L (ref 46–116)
ALT SERPL W P-5'-P-CCNC: 17 U/L (ref 5–49)
APTT PPP: 31.2 SECONDS (ref 20–32.1)
BUN SERPL-MCNC: 40 MG/DL (ref 9–23)
CHLORIDE SERPL-SCNC: 103 MMOL/L (ref 98–107)
POTASSIUM SERPL-SCNC: 4.8 MMOL/L (ref 3.4–5.1)
PROT SERPL-MCNC: 7.8 GM/DL (ref 6–8)

## 2024-01-04 ENCOUNTER — HOSPITAL ENCOUNTER (OUTPATIENT)
Dept: HOSPITAL 83 - LAB | Age: 89
Discharge: HOME | End: 2024-01-04
Attending: INTERNAL MEDICINE
Payer: MEDICARE

## 2024-01-04 DIAGNOSIS — E11.65: Primary | ICD-10-CM

## 2024-01-04 DIAGNOSIS — E78.5: ICD-10-CM

## 2024-01-04 DIAGNOSIS — N40.0: ICD-10-CM

## 2024-01-04 DIAGNOSIS — E55.9: ICD-10-CM

## 2024-01-04 LAB
25(OH)D3 SERPL-MCNC: 29.4 NG/ML (ref 30–100)
ALP SERPL-CCNC: 60 U/L (ref 46–116)
ALT SERPL W P-5'-P-CCNC: 15 U/L (ref 5–49)
BACTERIA #/AREA URNS HPF: (no result) /[HPF]
BUN SERPL-MCNC: 26 MG/DL (ref 9–23)
CHLORIDE SERPL-SCNC: 104 MMOL/L (ref 98–107)
CHOLEST SERPL-MCNC: 168 MG/DL (ref ?–200)
EPI CELLS #/AREA URNS HPF: (no result) /[HPF]
GLUCOSE UR QL: (no result)
LDLC SERPL DIRECT ASSAY-MCNC: 97 MG/DL (ref 9–159)
PH UR STRIP: 6 [PH] (ref 4.5–8)
POTASSIUM SERPL-SCNC: 4.8 MMOL/L (ref 3.4–5.1)
PROT SERPL-MCNC: 7.7 GM/DL (ref 6–8)
SP GR UR: 1.02 (ref 1–1.03)
TESTOST SERPL-MCNC: 331 NG/DL (ref 113–882)
TRIGL SERPL-MCNC: 114 MG/DL (ref ?–150)
UROBILINOGEN UR STRIP-MCNC: 0.2 E.U./DL (ref 0–1)
WBC #/AREA URNS HPF: (no result) WBC/HPF (ref 0–5)

## 2024-12-26 ENCOUNTER — HOSPITAL ENCOUNTER (INPATIENT)
Dept: HOSPITAL 83 - ED | Age: 89
LOS: 4 days | Discharge: HOME | DRG: 871 | End: 2024-12-30
Attending: STUDENT IN AN ORGANIZED HEALTH CARE EDUCATION/TRAINING PROGRAM | Admitting: STUDENT IN AN ORGANIZED HEALTH CARE EDUCATION/TRAINING PROGRAM
Payer: MEDICARE

## 2024-12-26 VITALS — DIASTOLIC BLOOD PRESSURE: 54 MMHG

## 2024-12-26 VITALS — BODY MASS INDEX: 42.66 KG/M2 | HEIGHT: 72 IN | WEIGHT: 315 LBS

## 2024-12-26 VITALS — DIASTOLIC BLOOD PRESSURE: 77 MMHG | SYSTOLIC BLOOD PRESSURE: 187 MMHG

## 2024-12-26 VITALS — SYSTOLIC BLOOD PRESSURE: 173 MMHG | DIASTOLIC BLOOD PRESSURE: 96 MMHG

## 2024-12-26 VITALS — DIASTOLIC BLOOD PRESSURE: 78 MMHG | SYSTOLIC BLOOD PRESSURE: 180 MMHG

## 2024-12-26 DIAGNOSIS — Z86.711: ICD-10-CM

## 2024-12-26 DIAGNOSIS — Z86.73: ICD-10-CM

## 2024-12-26 DIAGNOSIS — J20.9: ICD-10-CM

## 2024-12-26 DIAGNOSIS — Z82.49: ICD-10-CM

## 2024-12-26 DIAGNOSIS — J44.0: ICD-10-CM

## 2024-12-26 DIAGNOSIS — D64.9: ICD-10-CM

## 2024-12-26 DIAGNOSIS — E11.22: ICD-10-CM

## 2024-12-26 DIAGNOSIS — Z80.8: ICD-10-CM

## 2024-12-26 DIAGNOSIS — Z86.718: ICD-10-CM

## 2024-12-26 DIAGNOSIS — Z79.01: ICD-10-CM

## 2024-12-26 DIAGNOSIS — E43: ICD-10-CM

## 2024-12-26 DIAGNOSIS — Z79.4: ICD-10-CM

## 2024-12-26 DIAGNOSIS — D75.839: ICD-10-CM

## 2024-12-26 DIAGNOSIS — E11.65: ICD-10-CM

## 2024-12-26 DIAGNOSIS — Z79.899: ICD-10-CM

## 2024-12-26 DIAGNOSIS — Z83.3: ICD-10-CM

## 2024-12-26 DIAGNOSIS — N18.30: ICD-10-CM

## 2024-12-26 DIAGNOSIS — J84.10: ICD-10-CM

## 2024-12-26 DIAGNOSIS — Z79.2: ICD-10-CM

## 2024-12-26 DIAGNOSIS — Z82.3: ICD-10-CM

## 2024-12-26 DIAGNOSIS — A41.9: Primary | ICD-10-CM

## 2024-12-26 DIAGNOSIS — J44.1: ICD-10-CM

## 2024-12-26 DIAGNOSIS — I50.31: ICD-10-CM

## 2024-12-26 DIAGNOSIS — Z90.49: ICD-10-CM

## 2024-12-26 DIAGNOSIS — J96.21: ICD-10-CM

## 2024-12-26 DIAGNOSIS — E66.9: ICD-10-CM

## 2024-12-26 DIAGNOSIS — I13.0: ICD-10-CM

## 2024-12-26 LAB
BASOPHILS # BLD AUTO: 0.1 10*3/UL (ref 0–0.1)
BASOPHILS NFR BLD AUTO: 1.1 % (ref 0–1)
BUN SERPL-MCNC: 20 MG/DL (ref 9–23)
CHLORIDE SERPL-SCNC: 103 MMOL/L (ref 98–107)
EOSINOPHIL # BLD AUTO: 0.5 10*3/UL (ref 0–0.4)
EOSINOPHIL # BLD AUTO: 5.2 % (ref 1–4)
ERYTHROCYTE [DISTWIDTH] IN BLOOD BY AUTOMATED COUNT: 14.4 % (ref 0–14.5)
HCT VFR BLD AUTO: 34.9 % (ref 42–52)
MCH RBC QN AUTO: 25.5 PG (ref 27–31)
MCHC RBC AUTO-ENTMCNC: 29.8 G/DL (ref 33–37)
MCV RBC AUTO: 85.5 FL (ref 80–94)
MONOCYTES # BLD AUTO: 0.7 10*3/UL (ref 0.1–1)
MONOCYTES NFR BLD MANUAL: 6.5 % (ref 3–9)
NEUT #: 7.1 10*3/UL (ref 2.3–7.9)
NEUT %: 70.3 % (ref 47–73)
NRBC BLD QL AUTO: 0 10*3/UL (ref 0–0)
PLATELET # BLD AUTO: 415 10*3/UL (ref 130–400)
PMV BLD AUTO: 9.3 FL (ref 9.6–12.3)
POTASSIUM SERPL-SCNC: 3.8 MMOL/L (ref 3.4–5.1)
RBC # BLD AUTO: 4.08 10*6/UL (ref 4.5–5.9)
WBC NRBC COR # BLD AUTO: 10.1 10*3/UL (ref 4.8–10.8)

## 2024-12-27 VITALS — SYSTOLIC BLOOD PRESSURE: 140 MMHG | DIASTOLIC BLOOD PRESSURE: 95 MMHG

## 2024-12-27 VITALS — SYSTOLIC BLOOD PRESSURE: 129 MMHG | DIASTOLIC BLOOD PRESSURE: 46 MMHG

## 2024-12-27 VITALS — DIASTOLIC BLOOD PRESSURE: 53 MMHG | SYSTOLIC BLOOD PRESSURE: 154 MMHG

## 2024-12-27 VITALS — DIASTOLIC BLOOD PRESSURE: 67 MMHG | SYSTOLIC BLOOD PRESSURE: 147 MMHG

## 2024-12-27 VITALS — DIASTOLIC BLOOD PRESSURE: 50 MMHG

## 2024-12-27 VITALS — DIASTOLIC BLOOD PRESSURE: 63 MMHG

## 2024-12-27 VITALS — DIASTOLIC BLOOD PRESSURE: 60 MMHG

## 2024-12-27 LAB
ALP SERPL-CCNC: 71 U/L (ref 46–116)
ALT SERPL W P-5'-P-CCNC: 21 U/L (ref 5–49)
BASOPHILS # BLD AUTO: 0 10*3/UL (ref 0–0.1)
BASOPHILS NFR BLD AUTO: 0.3 % (ref 0–1)
BUN SERPL-MCNC: 25 MG/DL (ref 9–23)
CHLORIDE SERPL-SCNC: 102 MMOL/L (ref 98–107)
EOSINOPHIL # BLD AUTO: 0 % (ref 1–4)
EOSINOPHIL # BLD AUTO: 0 10*3/UL (ref 0–0.4)
ERYTHROCYTE [DISTWIDTH] IN BLOOD BY AUTOMATED COUNT: 14.6 % (ref 0–14.5)
HCT VFR BLD AUTO: 30.4 % (ref 42–52)
MCH RBC QN AUTO: 25.7 PG (ref 27–31)
MCHC RBC AUTO-ENTMCNC: 29.9 G/DL (ref 33–37)
MCV RBC AUTO: 85.9 FL (ref 80–94)
MONOCYTES # BLD AUTO: 0.9 10*3/UL (ref 0.1–1)
MONOCYTES NFR BLD MANUAL: 9.8 % (ref 3–9)
NEUT #: 8.1 10*3/UL (ref 2.3–7.9)
NEUT %: 84.5 % (ref 47–73)
NRBC BLD QL AUTO: 0 % (ref 0–0)
PLATELET # BLD AUTO: 382 10*3/UL (ref 130–400)
PMV BLD AUTO: 10 FL (ref 9.6–12.3)
POTASSIUM SERPL-SCNC: 4.5 MMOL/L (ref 3.4–5.1)
PROT SERPL-MCNC: 6.2 GM/DL (ref 6–8)
RBC # BLD AUTO: 3.54 10*6/UL (ref 4.5–5.9)
T4 FREE SERPL-MCNC: 1.05 NG/DL (ref 0.89–1.76)
WBC NRBC COR # BLD AUTO: 9.6 10*3/UL (ref 4.8–10.8)

## 2024-12-28 VITALS — SYSTOLIC BLOOD PRESSURE: 155 MMHG | DIASTOLIC BLOOD PRESSURE: 77 MMHG

## 2024-12-28 VITALS — DIASTOLIC BLOOD PRESSURE: 73 MMHG | SYSTOLIC BLOOD PRESSURE: 161 MMHG

## 2024-12-28 VITALS — DIASTOLIC BLOOD PRESSURE: 47 MMHG

## 2024-12-28 VITALS — DIASTOLIC BLOOD PRESSURE: 42 MMHG

## 2024-12-28 VITALS — SYSTOLIC BLOOD PRESSURE: 147 MMHG | DIASTOLIC BLOOD PRESSURE: 58 MMHG

## 2024-12-28 LAB
BUN SERPL-MCNC: 30 MG/DL (ref 9–23)
CHLORIDE SERPL-SCNC: 102 MMOL/L (ref 98–107)
ERYTHROCYTE [DISTWIDTH] IN BLOOD BY AUTOMATED COUNT: 14.8 % (ref 0–14.5)
HCT VFR BLD AUTO: 29.8 % (ref 42–52)
MANUAL DIFFERENTIAL PERFORMED BLD QL: YES
MCH RBC QN AUTO: 25.9 PG (ref 27–31)
MCHC RBC AUTO-ENTMCNC: 30.2 G/DL (ref 33–37)
MCV RBC AUTO: 85.9 FL (ref 80–94)
NRBC BLD QL AUTO: 0 % (ref 0–0)
PLATELET # BLD AUTO: 369 10*3/UL (ref 130–400)
PLATELET SUFFICIENCY: NORMAL
PMV BLD AUTO: 10.3 FL (ref 9.6–12.3)
POLYCHROMASIA BLD QL SMEAR: SLIGHT
POTASSIUM SERPL-SCNC: 4.9 MMOL/L (ref 3.4–5.1)
RBC # BLD AUTO: 3.47 10*6/UL (ref 4.5–5.9)
TOTAL CELLS COUNTED: 100 #CELLS
WBC NRBC COR # BLD AUTO: 11 10*3/UL (ref 4.8–10.8)

## 2024-12-29 VITALS — DIASTOLIC BLOOD PRESSURE: 66 MMHG

## 2024-12-29 VITALS — DIASTOLIC BLOOD PRESSURE: 54 MMHG | SYSTOLIC BLOOD PRESSURE: 145 MMHG

## 2024-12-29 VITALS — DIASTOLIC BLOOD PRESSURE: 69 MMHG

## 2024-12-29 VITALS — DIASTOLIC BLOOD PRESSURE: 54 MMHG

## 2024-12-29 VITALS — DIASTOLIC BLOOD PRESSURE: 60 MMHG

## 2024-12-29 LAB
BASOPHILS # BLD AUTO: 0 10*3/UL (ref 0–0.1)
BASOPHILS NFR BLD AUTO: 0.1 % (ref 0–1)
BUN SERPL-MCNC: 29 MG/DL (ref 9–23)
CHLORIDE SERPL-SCNC: 100 MMOL/L (ref 98–107)
EOSINOPHIL # BLD AUTO: 0 % (ref 1–4)
EOSINOPHIL # BLD AUTO: 0 10*3/UL (ref 0–0.4)
ERYTHROCYTE [DISTWIDTH] IN BLOOD BY AUTOMATED COUNT: 14.6 % (ref 0–14.5)
HCT VFR BLD AUTO: 30.3 % (ref 42–52)
MCH RBC QN AUTO: 25.4 PG (ref 27–31)
MCHC RBC AUTO-ENTMCNC: 29.4 G/DL (ref 33–37)
MCV RBC AUTO: 86.3 FL (ref 80–94)
MONOCYTES # BLD AUTO: 0.5 10*3/UL (ref 0.1–1)
MONOCYTES NFR BLD MANUAL: 5.2 % (ref 3–9)
NEUT #: 8.2 10*3/UL (ref 2.3–7.9)
NEUT %: 89.5 % (ref 47–73)
NRBC BLD QL AUTO: 0 10*3/UL (ref 0–0)
PLATELET # BLD AUTO: 367 10*3/UL (ref 130–400)
PMV BLD AUTO: 10.3 FL (ref 9.6–12.3)
POTASSIUM SERPL-SCNC: 4.9 MMOL/L (ref 3.4–5.1)
RBC # BLD AUTO: 3.51 10*6/UL (ref 4.5–5.9)
WBC NRBC COR # BLD AUTO: 9.2 10*3/UL (ref 4.8–10.8)

## 2024-12-30 VITALS — DIASTOLIC BLOOD PRESSURE: 62 MMHG

## 2024-12-30 VITALS — DIASTOLIC BLOOD PRESSURE: 68 MMHG

## 2024-12-30 VITALS — DIASTOLIC BLOOD PRESSURE: 76 MMHG | SYSTOLIC BLOOD PRESSURE: 169 MMHG

## 2024-12-30 LAB
BASOPHILS # BLD AUTO: 0 10*3/UL (ref 0–0.1)
BASOPHILS NFR BLD AUTO: 0.3 % (ref 0–1)
BUN SERPL-MCNC: 32 MG/DL (ref 9–23)
CHLORIDE SERPL-SCNC: 98 MMOL/L (ref 98–107)
EOSINOPHIL # BLD AUTO: 0 % (ref 1–4)
EOSINOPHIL # BLD AUTO: 0 10*3/UL (ref 0–0.4)
ERYTHROCYTE [DISTWIDTH] IN BLOOD BY AUTOMATED COUNT: 14.6 % (ref 0–14.5)
HCT VFR BLD AUTO: 30.9 % (ref 42–52)
MCH RBC QN AUTO: 25.1 PG (ref 27–31)
MCHC RBC AUTO-ENTMCNC: 29.1 G/DL (ref 33–37)
MCV RBC AUTO: 86.1 FL (ref 80–94)
MONOCYTES # BLD AUTO: 0.4 10*3/UL (ref 0.1–1)
MONOCYTES NFR BLD MANUAL: 5.4 % (ref 3–9)
NEUT #: 6.5 10*3/UL (ref 2.3–7.9)
NEUT %: 84.4 % (ref 47–73)
NRBC BLD QL AUTO: 0 % (ref 0–0)
PLATELET # BLD AUTO: 350 10*3/UL (ref 130–400)
PMV BLD AUTO: 10.4 FL (ref 9.6–12.3)
POTASSIUM SERPL-SCNC: 5.1 MMOL/L (ref 3.4–5.1)
RBC # BLD AUTO: 3.59 10*6/UL (ref 4.5–5.9)
WBC NRBC COR # BLD AUTO: 7.7 10*3/UL (ref 4.8–10.8)

## 2025-01-29 ENCOUNTER — HOSPITAL ENCOUNTER (OUTPATIENT)
Dept: HOSPITAL 83 - LAB | Age: 89
Discharge: HOME | End: 2025-01-29
Attending: NURSE PRACTITIONER
Payer: MEDICARE

## 2025-01-29 DIAGNOSIS — M10.9: ICD-10-CM

## 2025-01-29 DIAGNOSIS — N18.2: ICD-10-CM

## 2025-01-29 DIAGNOSIS — Z79.899: ICD-10-CM

## 2025-01-29 DIAGNOSIS — E55.9: ICD-10-CM

## 2025-01-29 DIAGNOSIS — E11.65: ICD-10-CM

## 2025-01-29 DIAGNOSIS — M25.551: ICD-10-CM

## 2025-01-29 DIAGNOSIS — E11.22: ICD-10-CM

## 2025-01-29 DIAGNOSIS — I12.9: Primary | ICD-10-CM

## 2025-01-29 LAB
25(OH)D3 SERPL-MCNC: 28.5 NG/ML (ref 30–100)
ALP SERPL-CCNC: 61 U/L (ref 46–116)
ALT SERPL W P-5'-P-CCNC: 10 U/L (ref 5–49)
BASOPHILS # BLD AUTO: 0.1 10*3/UL (ref 0–0.1)
BASOPHILS NFR BLD AUTO: 0.8 % (ref 0–1)
BUN SERPL-MCNC: 34 MG/DL (ref 9–23)
CHLORIDE SERPL-SCNC: 99 MMOL/L (ref 98–107)
CHOLEST SERPL-MCNC: 177 MG/DL (ref ?–200)
EOSINOPHIL # BLD AUTO: 0.3 10*3/UL (ref 0–0.4)
EOSINOPHIL # BLD AUTO: 3 % (ref 1–4)
ERYTHROCYTE [DISTWIDTH] IN BLOOD BY AUTOMATED COUNT: 15.7 % (ref 0–14.5)
HCT VFR BLD AUTO: 35.6 % (ref 42–52)
LDLC SERPL DIRECT ASSAY-MCNC: 103 MG/DL (ref 9–159)
MCH RBC QN AUTO: 25.1 PG (ref 27–31)
MCHC RBC AUTO-ENTMCNC: 29.8 G/DL (ref 33–37)
MCV RBC AUTO: 84.4 FL (ref 80–94)
MONOCYTES # BLD AUTO: 0.6 10*3/UL (ref 0.1–1)
MONOCYTES NFR BLD MANUAL: 7 % (ref 3–9)
NEUT #: 5.7 10*3/UL (ref 2.3–7.9)
NEUT %: 66.4 % (ref 47–73)
NRBC BLD QL AUTO: 0 10*3/UL (ref 0–0)
PLATELET # BLD AUTO: 431 10*3/UL (ref 130–400)
PMV BLD AUTO: 9.2 FL (ref 9.6–12.3)
POTASSIUM SERPL-SCNC: 4.7 MMOL/L (ref 3.4–5.1)
PROT SERPL-MCNC: 7.6 GM/DL (ref 6–8)
RBC # BLD AUTO: 4.22 10*6/UL (ref 4.5–5.9)
TRIGL SERPL-MCNC: 106 MG/DL (ref ?–150)
URATE SERPL-MCNC: 6 MG/DL (ref 3.7–9.2)
WBC NRBC COR # BLD AUTO: 8.6 10*3/UL (ref 4.8–10.8)

## 2025-05-19 ENCOUNTER — PREP FOR PROCEDURE (OUTPATIENT)
Dept: SURGERY | Age: 89
End: 2025-05-19

## 2025-05-19 RX ORDER — SODIUM CHLORIDE 0.9 % (FLUSH) 0.9 %
5-40 SYRINGE (ML) INJECTION PRN
Status: CANCELLED | OUTPATIENT
Start: 2025-05-19

## 2025-05-19 RX ORDER — SODIUM CHLORIDE 9 MG/ML
INJECTION, SOLUTION INTRAVENOUS CONTINUOUS
Status: CANCELLED | OUTPATIENT
Start: 2025-05-19

## 2025-05-19 RX ORDER — SODIUM CHLORIDE 0.9 % (FLUSH) 0.9 %
5-40 SYRINGE (ML) INJECTION EVERY 12 HOURS SCHEDULED
Status: CANCELLED | OUTPATIENT
Start: 2025-05-19

## 2025-05-19 NOTE — H&P
[facility]  User  LR       Diagnostics  Nurse/Allied Health  Medications  Provider Notes  History & Problems  Administrative  Other Clinical  Workload Items  Flowsheets  Health Mgmt  Summary  Activity    Activity     ACTIVITY DATE  DESCRIPTION  STATUS  BY  25 10:15  BoomkirstyAlayna  25 10:09  BoomkirstyAlayna  25 10:06  SweetSharyn alaniz  25 10:03  SweetSharyn alaniz  25 10:02  Ambulatory Office Visit Scheduled  SPS.061  25 09:44  Mayte Wei  25 10:29  Terry Montgomery  25 16:52  Terry Montgomery  25 11:39  Ambulatory Office Visit Departed  SPS.514  25 11:06  Ambulatory Office Visit Registration  SPS.514  25 08:26  Squamous Cell Carcinoma Left Lower Leg  Terry Montgomery MD  Date: 22 00:00  25 08:25  Wide Local Excision of left Lower Extremity with Skin Graft with Abdomen Donor Site  Terry Montgomery MD  Date: 24 00:00  25 08:15  Ambulatory Office Visit Scheduled  SPS.514  Kaden Espino  89, [id number]  MRN#   [id number]  Booked  [facility] ERIKA SPS.061     Visit Date: 25  Search Patient's Chart     25  11:14           No Data to Display  25  No Data to Display  Colorectal Health  Kaden Espino  M  1935        Allergy/Adv: No Known Allergies  [facility]  [address]  [place] [phone]    General Surgery Visit/H&P   Signed    Patient: Kaden Espino  MR#: [id number]  : 1935  Acct:[id number]  Age/Sex: 89 / M  ADM Date: 25  Loc: SPS.514            Provider Location:   cc: ~        Intake  Vital Signs      25  11:14  Height   6 ft 2 in  Weight   325 lb  BMI   41.7  BP   162/86 H  Mean Arterial Pressure   111  Pulse Rate   72    Intake  Visit Reasons: chest lesion  Current Pain: No  Fall Within Last 3 Months: No  Allergies    No Known Allergies Allergy (Verified 25 11:16)      Safety Concerns: Feels Safe At This Time    [facility]  Medical History

## 2025-05-19 NOTE — PROGRESS NOTES
[facility] PRE-ADMISSION TESTING INSTRUCTIONS    Surgery Date 5-20-25  Arrival time 7:15 a.m.    The Preadmission Testing patient is instructed accordingly using the following criteria (check applicable):    ARRIVAL INSTRUCTIONS:  [x] Parking the day of Surgery is located in the Main Entrance lot.  Upon entering the door, make an immediate right to the surgery reception desk    [x] Bring photo ID and insurance card    [] Bring in a copy of Living will or Durable Power of  papers.    [x] Please be sure to arrange for responsible adult to provide transportation to and from the hospital    [x] Please arrange for responsible adult to be with you for the 24 hour period post procedure due to having anesthesia    [x] If you awake am of surgery not feeling well or have temperature >100 please call [phone]    GENERAL INSTRUCTIONS:    [x] May have up to 8 ounces of water only until 4 hours prior to surgery.  NPO time 0500.          No gum, candy or mints.    [x] You may brush your teeth, but do not swallow any water    [x] Take medications as instructed with 1-2 oz of water    [x] Stop herbal supplements and vitamins 5 days prior to procedure    [x] Follow preop dosing of blood thinners per physician instructions    [x] Take 1/2 dose of evening insulin, but no insulin after midnight    [x] No oral diabetic medications after midnight    [x] If diabetic and have low blood sugar or feel symptomatic, take 1-2oz apple juice only    [] Bring inhalers day of surgery    [] Bring C-PAP/ Bi-Pap day of surgery    [] Bring urine specimen day of surgery    [x] Shower or bath with soap, lather and rinse well, AM of Surgery, no lotion, powders or creams to surgical site    [] Follow bowel prep as instructed per surgeon    [x] No tobacco products within 24 hours of surgery     [x] No alcohol or illegal drug use within 24 hours of surgery.    [x] Jewelry, body piercing's, eyeglasses, contact lenses and

## 2025-05-19 NOTE — H&P (VIEW-ONLY)
[facility]  User  LR       Diagnostics  Nurse/Allied Health  Medications  Provider Notes  History & Problems  Administrative  Other Clinical  Workload Items  Flowsheets  Health Mgmt  Summary  Activity    Activity     ACTIVITY DATE  DESCRIPTION  STATUS  BY  25 10:15  BoomkisrtyAlayna  25 10:09  oBomkirstyAlayna  25 10:06  SweetSharyn alaniz  25 10:03  SweetSharyn alaniz  25 10:02  Ambulatory Office Visit Scheduled  SPS.061  25 09:44  Mayte Wei  25 10:29  Terry Montgomery  25 16:52  Terry Montgomery  25 11:39  Ambulatory Office Visit Departed  SPS.514  25 11:06  Ambulatory Office Visit Registration  SPS.514  25 08:26  Squamous Cell Carcinoma Left Lower Leg  Terry Montgomery MD  Date: 22 00:00  25 08:25  Wide Local Excision of left Lower Extremity with Skin Graft with Abdomen Donor Site  Terry Montgomery MD  Date: 24 00:00  25 08:15  Ambulatory Office Visit Scheduled  SPS.514  Kdaen Espino  89, [id number]  MRN#   [id number]  Booked  [facility] SPS.061     Visit Date: 25  Search Patient's Chart     25  11:14           No Data to Display  25  No Data to Display  Colorectal Health  Kaden Espino  M  1935        Allergy/Adv: No Known Allergies  [facility]  [address]  [place] [id number]    General Surgery Visit/H&P   Signed    Patient: Kaden Espino  MR#: [id number]  : 1935  Acct:[id number]  Age/Sex: 89 / M  ADM Date: 25  Loc: SPS.514            Provider Location:   cc: ~        Intake  Vital Signs      25  11:14  Height   6 ft 2 in  Weight   325 lb  BMI   41.7  BP   162/86 H  Mean Arterial Pressure   111  Pulse Rate   72    Intake  Visit Reasons: chest lesion  Current Pain: No  Fall Within Last 3 Months: No  Allergies    No Known Allergies Allergy (Verified 25 11:16)      Safety Concerns: Feels Safe At This Time    [facility]  Medical History

## 2025-05-20 ENCOUNTER — ANESTHESIA EVENT (OUTPATIENT)
Dept: OPERATING ROOM | Age: 89
End: 2025-05-20
Payer: MEDICARE

## 2025-05-20 ENCOUNTER — HOSPITAL ENCOUNTER (OUTPATIENT)
Age: 89
Setting detail: OUTPATIENT SURGERY
Discharge: HOME | End: 2025-05-20
Attending: SURGERY | Admitting: SURGERY
Payer: MEDICARE

## 2025-05-20 ENCOUNTER — ANESTHESIA (OUTPATIENT)
Dept: OPERATING ROOM | Age: 89
End: 2025-05-20
Payer: MEDICARE

## 2025-05-20 VITALS
OXYGEN SATURATION: 100 % | RESPIRATION RATE: 16 BRPM | HEART RATE: 97 BPM | SYSTOLIC BLOOD PRESSURE: 190 MMHG | HEIGHT: 73 IN | WEIGHT: 315 LBS | BODY MASS INDEX: 41.75 KG/M2 | TEMPERATURE: 97.3 F | DIASTOLIC BLOOD PRESSURE: 74 MMHG

## 2025-05-20 DIAGNOSIS — D49.89 NEOPLASM OF CHEST WALL: ICD-10-CM

## 2025-05-20 LAB — GLUCOSE BLD-MCNC: 293 MG/DL (ref 74–99)

## 2025-05-20 PROCEDURE — 6360000002 HC RX W HCPCS: Performed by: NURSE ANESTHETIST, CERTIFIED REGISTERED

## 2025-05-20 PROCEDURE — 2580000003 HC RX 258: Performed by: NURSE ANESTHETIST, CERTIFIED REGISTERED

## 2025-05-20 PROCEDURE — 88305 TISSUE EXAM BY PATHOLOGIST: CPT

## 2025-05-20 PROCEDURE — 2500000003 HC RX 250 WO HCPCS: Performed by: SURGERY

## 2025-05-20 PROCEDURE — 7100000010 HC PHASE II RECOVERY - FIRST 15 MIN: Performed by: SURGERY

## 2025-05-20 PROCEDURE — 3700000001 HC ADD 15 MINUTES (ANESTHESIA): Performed by: SURGERY

## 2025-05-20 PROCEDURE — 3600000013 HC SURGERY LEVEL 3 ADDTL 15MIN: Performed by: SURGERY

## 2025-05-20 PROCEDURE — 7100000011 HC PHASE II RECOVERY - ADDTL 15 MIN: Performed by: SURGERY

## 2025-05-20 PROCEDURE — 3600000003 HC SURGERY LEVEL 3 BASE: Performed by: SURGERY

## 2025-05-20 PROCEDURE — 6360000002 HC RX W HCPCS: Performed by: SURGERY

## 2025-05-20 PROCEDURE — 3700000000 HC ANESTHESIA ATTENDED CARE: Performed by: SURGERY

## 2025-05-20 PROCEDURE — 2709999900 HC NON-CHARGEABLE SUPPLY: Performed by: SURGERY

## 2025-05-20 PROCEDURE — 82962 GLUCOSE BLOOD TEST: CPT

## 2025-05-20 PROCEDURE — 6360000002 HC RX W HCPCS: Performed by: ANESTHESIOLOGY

## 2025-05-20 RX ORDER — SODIUM CHLORIDE 9 MG/ML
INJECTION, SOLUTION INTRAVENOUS
Status: DISCONTINUED | OUTPATIENT
Start: 2025-05-20 | End: 2025-05-20 | Stop reason: SDUPTHER

## 2025-05-20 RX ORDER — POLYETHYLENE GLYCOL 3350 17 G/17G
17 POWDER, FOR SOLUTION ORAL DAILY
Qty: 238 G | Refills: 1 | Status: SHIPPED | OUTPATIENT
Start: 2025-05-20 | End: 2025-06-19

## 2025-05-20 RX ORDER — FENTANYL CITRATE 50 UG/ML
INJECTION, SOLUTION INTRAMUSCULAR; INTRAVENOUS
Status: DISCONTINUED | OUTPATIENT
Start: 2025-05-20 | End: 2025-05-20 | Stop reason: SDUPTHER

## 2025-05-20 RX ORDER — ONDANSETRON 4 MG/1
4 TABLET, FILM COATED ORAL 3 TIMES DAILY PRN
Qty: 15 TABLET | Refills: 0 | Status: SHIPPED | OUTPATIENT
Start: 2025-05-20

## 2025-05-20 RX ORDER — CEFAZOLIN 2 G/1
INJECTION, POWDER, FOR SOLUTION INTRAMUSCULAR; INTRAVENOUS
Status: DISCONTINUED
Start: 2025-05-20 | End: 2025-05-20 | Stop reason: HOSPADM

## 2025-05-20 RX ORDER — OXYCODONE AND ACETAMINOPHEN 5; 325 MG/1; MG/1
1 TABLET ORAL EVERY 6 HOURS PRN
Qty: 28 TABLET | Refills: 0 | Status: SHIPPED | OUTPATIENT
Start: 2025-05-20 | End: 2025-05-27

## 2025-05-20 RX ORDER — PROPOFOL 10 MG/ML
INJECTION, EMULSION INTRAVENOUS
Status: DISCONTINUED | OUTPATIENT
Start: 2025-05-20 | End: 2025-05-20 | Stop reason: SDUPTHER

## 2025-05-20 RX ORDER — HYDRALAZINE HYDROCHLORIDE 20 MG/ML
5 INJECTION INTRAMUSCULAR; INTRAVENOUS
Status: COMPLETED | OUTPATIENT
Start: 2025-05-20 | End: 2025-05-20

## 2025-05-20 RX ORDER — WATER 10 ML/10ML
INJECTION INTRAMUSCULAR; INTRAVENOUS; SUBCUTANEOUS
Status: DISCONTINUED
Start: 2025-05-20 | End: 2025-05-20 | Stop reason: HOSPADM

## 2025-05-20 RX ORDER — SODIUM CHLORIDE 0.9 % (FLUSH) 0.9 %
5-40 SYRINGE (ML) INJECTION PRN
Status: DISCONTINUED | OUTPATIENT
Start: 2025-05-20 | End: 2025-05-20 | Stop reason: HOSPADM

## 2025-05-20 RX ORDER — LIDOCAINE HYDROCHLORIDE 10 MG/ML
INJECTION, SOLUTION INFILTRATION; PERINEURAL PRN
Status: DISCONTINUED | OUTPATIENT
Start: 2025-05-20 | End: 2025-05-20 | Stop reason: ALTCHOICE

## 2025-05-20 RX ORDER — SODIUM CHLORIDE 0.9 % (FLUSH) 0.9 %
5-40 SYRINGE (ML) INJECTION EVERY 12 HOURS SCHEDULED
Status: DISCONTINUED | OUTPATIENT
Start: 2025-05-20 | End: 2025-05-20 | Stop reason: HOSPADM

## 2025-05-20 RX ORDER — SODIUM CHLORIDE 9 MG/ML
INJECTION, SOLUTION INTRAVENOUS CONTINUOUS
Status: DISCONTINUED | OUTPATIENT
Start: 2025-05-20 | End: 2025-05-20 | Stop reason: HOSPADM

## 2025-05-20 RX ADMIN — PROPOFOL 50 MG: 10 INJECTION, EMULSION INTRAVENOUS at 08:50

## 2025-05-20 RX ADMIN — HYDRALAZINE HYDROCHLORIDE 5 MG: 20 INJECTION INTRAMUSCULAR; INTRAVENOUS at 10:00

## 2025-05-20 RX ADMIN — PROPOFOL 100 MCG/KG/MIN: 10 INJECTION, EMULSION INTRAVENOUS at 08:51

## 2025-05-20 RX ADMIN — SODIUM CHLORIDE: 9 INJECTION, SOLUTION INTRAVENOUS at 08:39

## 2025-05-20 RX ADMIN — WATER 3000 MG: 1 INJECTION INTRAMUSCULAR; INTRAVENOUS; SUBCUTANEOUS at 08:47

## 2025-05-20 RX ADMIN — FENTANYL CITRATE 50 MCG: 50 INJECTION, SOLUTION INTRAMUSCULAR; INTRAVENOUS at 08:50

## 2025-05-20 ASSESSMENT — ENCOUNTER SYMPTOMS: SHORTNESS OF BREATH: 1

## 2025-05-20 ASSESSMENT — PAIN SCALES - GENERAL
PAINLEVEL_OUTOF10: 0

## 2025-05-20 ASSESSMENT — PAIN - FUNCTIONAL ASSESSMENT: PAIN_FUNCTIONAL_ASSESSMENT: NONE - DENIES PAIN

## 2025-05-20 NOTE — DISCHARGE INSTRUCTIONS
Patient Discharge Instructions  Dr. Terry Montgomery MD    Call for follow up appointment   Discharge Date:  5/20/2025    Discharged To:  Home    RESUME ACTIVITY:      BATHING:  May shower 24hrs after surgery, remove dressings after 24hrs if in place, your incision was closed with non absorbable sutures.  These will need to be removed in the office at your follow up appointment .  You can cover the incision with clean gauze as needed for drainage.    DRIVING: No driving while on pain medications    WALKING:  Yes    SEXUAL ACTIVITY: Yes    STAIRS:  Yes    LIFTING: Activity as tolerated     DIET: General adult    SPECIAL INSTRUCTIONS:     Call physician if they or any other problems occur:  Fever over 101°    Redness, swelling, hardness or warmth at the operative site  Unrelieved nausea    Foul smelling or cloudy drainage at the operative site   Unrelieved pain    Blood soaked dressing. (Some oozing may be normal)    Call office for follow up appointment in 2 weeks with Dr Montgomery.    Call the office  if you have a fever > 100 F, or if your incision becomes red, tender, or drains more than a small amount of clear fluid.    Keep incisions clean and dry.  Vicodin/Percocet and ibuprofen for pain as prescribed. Okay to resume anticoagulant medication after 24hrs.      Do not exceed 4000mg of Tylenol/Acetaminophen per day. Vicodin/Norco/Percocet contain acetaminophen. Do not take additional amounts of Tylenol if you are taking these medications.

## 2025-05-20 NOTE — INTERVAL H&P NOTE
Update History & Physical    The patient's History and Physical of May 19, 2025 was reviewed with the patient and I examined the patient. There was no change. The surgical site was confirmed by the patient and me.     Plan: The risks, benefits, expected outcome, and alternative to the recommended procedure have been discussed with the patient. Patient understands and wants to proceed with the procedure.     Electronically signed by Walter Kamara DO on 5/20/2025 at 7:48 AM

## 2025-05-20 NOTE — ANESTHESIA PRE PROCEDURE
Department of Anesthesiology  Preprocedure Note       Name:  Kaden Espino Sr.   Age:  89 y.o.  :  1935                                          MRN:  [id number]         Date:  2025      Surgeon: Surgeon(s):  Terry Montgomery MD    Procedure: Procedure(s):  WIDE LOCAL EXCISION RIGHT CHEST WALL LESION    Medications prior to admission:   Prior to Admission medications    Medication Sig Start Date End Date Taking? Authorizing Provider   metFORMIN (GLUCOPHAGE) 850 MG tablet Take 1 tablet by mouth 2 times daily (with meals)   Yes Ada Jenkins MD   NIFEdipine (ADALAT CC) 90 MG extended release tablet Take 1 tablet by mouth daily   Yes Ada Jenkins MD   hydrALAZINE (APRESOLINE) 10 MG tablet Take 1 tablet by mouth 3 times daily   Yes Ada Jenkins MD   insulin glargine (LANTUS) 100 UNIT/ML injection vial Inject 6 Units into the skin nightly   Yes Ada Jenkins MD   Insulin Lispro (HUMALOG KWIKPEN SC) Inject into the skin 3 times daily (with meals) As directed per sliding scale   Yes Ada Jenkins MD   cloNIDine (CATAPRES) 0.1 MG tablet Take 1 tablet by mouth in the morning, at noon, and at bedtime   Yes Ada Jenkins MD   fenofibrate (TRICOR) 145 MG tablet Take 1 tablet by mouth daily At night   Yes Ada Jenkins MD   rivaroxaban (XARELTO) 20 MG TABS tablet Take 1 tablet by mouth Daily with supper    Ada Jenkins MD   furosemide (LASIX) 40 MG tablet Take 40 mg by mouth daily  Patient not taking: Reported on 2025    Ada Jenkins MD   losartan (COZAAR) 100 MG tablet Take 100 mg by mouth daily    Ada Jenkins MD   OXYGEN Inhale 3 L into the lungs as needed    Ada Jenkins MD       Current medications:    Current Facility-Administered Medications   Medication Dose Route Frequency Provider Last Rate Last Admin    sodium chloride flush 0.9 % injection 5-40 mL  5-40 mL IntraVENous 2 times per day Terry Montgomery MD

## 2025-05-20 NOTE — ANESTHESIA POSTPROCEDURE EVALUATION
Department of Anesthesiology  Postprocedure Note    Patient: Kaden Espino Sr.  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    Deann Phase I: Deann Score: 10    Deann Phase II:      Anesthesia Post Evaluation    Patient location during evaluation: PACU  Patient participation: complete - patient participated  Level of consciousness: awake  Airway patency: patent  Nausea & Vomiting: no nausea and no vomiting  Cardiovascular status: hemodynamically stable  Respiratory status: acceptable  Hydration status: euvolemic  Pain management: adequate        No notable events documented.

## 2025-05-20 NOTE — OP NOTE
Operative Note      Patient: Kaden Espino Sr.  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  Surgical excision of Right chest wall lesion.  Size of excision -- 6 x 1 centimeters  2.  Intermediate repair of wound with layered closure.  Total length of closure equals 11 centimeters    Surgeon(s):  Terry Montgomery MD    Assistant:   Resident: Walter Kamara DO    Anesthesia: Monitor Anesthesia Care    Estimated Blood Loss (mL): Minimal    Complications: None    Specimens:   ID Type Source Tests Collected by Time Destination   A : RIGHT CHEST WALL SKIN LESION Tissue Tissue SURGICAL PATHOLOGY Terry Montgomery MD 5/20/2025 0906        Implants:  * No implants in log *      Drains: * No LDAs found *    Findings:  Infection Present At Time Of Surgery (PATOS) (choose all levels that have infection present):  No infection present  Other Findings: Removal of chest wall neoplasm with wide local excisions   This procedure was not performed to treat primary cutaneous melanoma through wide local excision    Detailed Description of Procedure:     The patient was placed supine on the operating table.  Anesthesia was obtained via the anesthesia record and preoperative antibiotics were given.  The anterior chest wall was prepped and draped in the standard sterile fashion. 20 cc 1% lidocaine was injected to provide local anesthesia.  Using a #15 blade, an elliptical incision was used to excise the skin lesion.  Incision was carried down through the dermis and epidermis to the level of the subcutaneous tissue. The lesion excised measured  6 x 1 centimeters.    The specimen was removed and placed in a specimen container for surgical pathology. hemostasis was achieved with use of both Bovie electrocautery and direct pressure. Afterwards the wound was

## 2025-05-25 LAB — SURGICAL PATHOLOGY REPORT: NORMAL

## (undated) DEVICE — 1.5 TO 1 DERMACARRIER: Brand: MESHGRAFTTM  II TISSUE EXPANSION SYSTEM

## (undated) DEVICE — DERMATOME BLADES: Brand: DERMATOME

## (undated) DEVICE — BLADE,STAINLESS-STEEL,15,STRL,DISPOSABLE: Brand: MEDLINE

## (undated) DEVICE — PACK,UNIVERSAL,NO GOWNS: Brand: MEDLINE

## (undated) DEVICE — DOUBLE BASIN SET: Brand: MEDLINE INDUSTRIES, INC.

## (undated) DEVICE — 4-PORT MANIFOLD: Brand: NEPTUNE 2

## (undated) DEVICE — TOWEL,OR,DSP,ST,BLUE,STD,6/PK,12PK/CS: Brand: MEDLINE

## (undated) DEVICE — PACK PROCEDURE SURG GEN CUST

## (undated) DEVICE — MARKER,SKIN,WI/RULER AND LABELS: Brand: MEDLINE

## (undated) DEVICE — GLOVE ORANGE PI 7 1/2   MSG9075

## (undated) DEVICE — COVER HNDL LT DISP

## (undated) DEVICE — SET INSTRUMENT LAP I

## (undated) DEVICE — SPONGE,LAP,4"X18",XR,ST,5/PK,40PK/CS: Brand: MEDLINE INDUSTRIES, INC.

## (undated) DEVICE — Device

## (undated) DEVICE — NEEDLE HYPO 23GA L1.5IN TURQ POLYPR HUB S STL THN WALL IM

## (undated) DEVICE — ADHESIVE SKIN CLSR 0.7ML TOP DERMBND ADV

## (undated) DEVICE — DRESSING,GAUZE,XEROFORM,CURAD,5"X9",ST: Brand: CURAD

## (undated) DEVICE — NEEDLE HYPO 22GA L1.5IN BLK POLYPR HUB S STL REG BVL STR

## (undated) DEVICE — DRAPE,LAPAROTOMY,PCH,STERILE: Brand: MEDLINE

## (undated) DEVICE — ELECTRODE PT RET AD L9FT HI MOIST COND ADH HYDRGEL CORDED

## (undated) DEVICE — GOWN,SIRUS,FABRNF,XL,20/CS: Brand: MEDLINE